# Patient Record
Sex: MALE | Race: WHITE | Employment: FULL TIME | URBAN - METROPOLITAN AREA
[De-identification: names, ages, dates, MRNs, and addresses within clinical notes are randomized per-mention and may not be internally consistent; named-entity substitution may affect disease eponyms.]

---

## 2022-05-23 ENCOUNTER — OFFICE VISIT (OUTPATIENT)
Dept: URGENT CARE | Facility: CLINIC | Age: 27
End: 2022-05-23
Payer: COMMERCIAL

## 2022-05-23 VITALS
SYSTOLIC BLOOD PRESSURE: 146 MMHG | TEMPERATURE: 98 F | HEART RATE: 112 BPM | RESPIRATION RATE: 18 BRPM | BODY MASS INDEX: 28.63 KG/M2 | OXYGEN SATURATION: 100 % | DIASTOLIC BLOOD PRESSURE: 70 MMHG | HEIGHT: 70 IN | WEIGHT: 200 LBS

## 2022-05-23 DIAGNOSIS — R36.9 PENILE DISCHARGE: ICD-10-CM

## 2022-05-23 DIAGNOSIS — R30.0 BURNING WITH URINATION: Primary | ICD-10-CM

## 2022-05-23 DIAGNOSIS — N34.2 URETHRITIS: ICD-10-CM

## 2022-05-23 LAB
BILIRUB UR QL STRIP: NEGATIVE
GLUCOSE UR QL STRIP: NEGATIVE
KETONES UR QL STRIP: NEGATIVE
LEUKOCYTE ESTERASE UR QL STRIP: NEGATIVE
PH, POC UA: 5
POC BLOOD, URINE: POSITIVE
POC NITRATES, URINE: NEGATIVE
PROT UR QL STRIP: NEGATIVE
SP GR UR STRIP: 1.02 (ref 1–1.03)
UROBILINOGEN UR STRIP-ACNC: NORMAL (ref 0.3–2.2)

## 2022-05-23 PROCEDURE — 96372 THER/PROPH/DIAG INJ SC/IM: CPT | Mod: S$GLB,,, | Performed by: INTERNAL MEDICINE

## 2022-05-23 PROCEDURE — 36415 COLL VENOUS BLD VENIPUNCTURE: CPT | Performed by: INTERNAL MEDICINE

## 2022-05-23 PROCEDURE — 3077F PR MOST RECENT SYSTOLIC BLOOD PRESSURE >= 140 MM HG: ICD-10-PCS | Mod: CPTII,S$GLB,, | Performed by: INTERNAL MEDICINE

## 2022-05-23 PROCEDURE — 81003 URINALYSIS AUTO W/O SCOPE: CPT | Mod: QW,S$GLB,, | Performed by: INTERNAL MEDICINE

## 2022-05-23 PROCEDURE — 1159F MED LIST DOCD IN RCRD: CPT | Mod: CPTII,S$GLB,, | Performed by: INTERNAL MEDICINE

## 2022-05-23 PROCEDURE — 86592 SYPHILIS TEST NON-TREP QUAL: CPT | Performed by: INTERNAL MEDICINE

## 2022-05-23 PROCEDURE — 99203 PR OFFICE/OUTPT VISIT, NEW, LEVL III, 30-44 MIN: ICD-10-PCS | Mod: 25,S$GLB,, | Performed by: INTERNAL MEDICINE

## 2022-05-23 PROCEDURE — 86803 HEPATITIS C AB TEST: CPT | Performed by: INTERNAL MEDICINE

## 2022-05-23 PROCEDURE — 87661 TRICHOMONAS VAGINALIS AMPLIF: CPT | Performed by: INTERNAL MEDICINE

## 2022-05-23 PROCEDURE — 3008F PR BODY MASS INDEX (BMI) DOCUMENTED: ICD-10-PCS | Mod: CPTII,S$GLB,, | Performed by: INTERNAL MEDICINE

## 2022-05-23 PROCEDURE — 87591 N.GONORRHOEAE DNA AMP PROB: CPT | Performed by: INTERNAL MEDICINE

## 2022-05-23 PROCEDURE — 96372 PR INJECTION,THERAP/PROPH/DIAG2ST, IM OR SUBCUT: ICD-10-PCS | Mod: S$GLB,,, | Performed by: INTERNAL MEDICINE

## 2022-05-23 PROCEDURE — 81003 POCT URINALYSIS, DIPSTICK, AUTOMATED, W/O SCOPE: ICD-10-PCS | Mod: QW,S$GLB,, | Performed by: INTERNAL MEDICINE

## 2022-05-23 PROCEDURE — 87491 CHLMYD TRACH DNA AMP PROBE: CPT | Performed by: INTERNAL MEDICINE

## 2022-05-23 PROCEDURE — 1159F PR MEDICATION LIST DOCUMENTED IN MEDICAL RECORD: ICD-10-PCS | Mod: CPTII,S$GLB,, | Performed by: INTERNAL MEDICINE

## 2022-05-23 PROCEDURE — 3077F SYST BP >= 140 MM HG: CPT | Mod: CPTII,S$GLB,, | Performed by: INTERNAL MEDICINE

## 2022-05-23 PROCEDURE — 3078F DIAST BP <80 MM HG: CPT | Mod: CPTII,S$GLB,, | Performed by: INTERNAL MEDICINE

## 2022-05-23 PROCEDURE — 3008F BODY MASS INDEX DOCD: CPT | Mod: CPTII,S$GLB,, | Performed by: INTERNAL MEDICINE

## 2022-05-23 PROCEDURE — 1160F PR REVIEW ALL MEDS BY PRESCRIBER/CLIN PHARMACIST DOCUMENTED: ICD-10-PCS | Mod: CPTII,S$GLB,, | Performed by: INTERNAL MEDICINE

## 2022-05-23 PROCEDURE — 87389 HIV-1 AG W/HIV-1&-2 AB AG IA: CPT | Performed by: INTERNAL MEDICINE

## 2022-05-23 PROCEDURE — 3078F PR MOST RECENT DIASTOLIC BLOOD PRESSURE < 80 MM HG: ICD-10-PCS | Mod: CPTII,S$GLB,, | Performed by: INTERNAL MEDICINE

## 2022-05-23 PROCEDURE — 1160F RVW MEDS BY RX/DR IN RCRD: CPT | Mod: CPTII,S$GLB,, | Performed by: INTERNAL MEDICINE

## 2022-05-23 PROCEDURE — 99203 OFFICE O/P NEW LOW 30 MIN: CPT | Mod: 25,S$GLB,, | Performed by: INTERNAL MEDICINE

## 2022-05-23 RX ORDER — DOXYCYCLINE HYCLATE 100 MG
100 TABLET ORAL 2 TIMES DAILY
Qty: 14 TABLET | Refills: 0 | Status: SHIPPED | OUTPATIENT
Start: 2022-05-23 | End: 2022-05-30

## 2022-05-23 RX ORDER — CEFTRIAXONE 500 MG/1
500 INJECTION, POWDER, FOR SOLUTION INTRAMUSCULAR; INTRAVENOUS
Status: COMPLETED | OUTPATIENT
Start: 2022-05-23 | End: 2022-05-23

## 2022-05-23 RX ADMIN — CEFTRIAXONE 500 MG: 500 INJECTION, POWDER, FOR SOLUTION INTRAMUSCULAR; INTRAVENOUS at 05:05

## 2022-05-23 NOTE — PROGRESS NOTES
"Subjective:       Patient ID: eBnnett Rubin is a 26 y.o. male.    Vitals:  height is 5' 10" (1.778 m) and weight is 90.7 kg (200 lb). His tympanic temperature is 97.9 °F (36.6 °C). His blood pressure is 146/70 (abnormal) and his pulse is 112 (abnormal). His respiration is 18 and oxygen saturation is 100%.     Chief Complaint: Dysuria    Started yesterday. Burning and pain with urination. States that he uses condoms when having intercourse.  Last encounter was last week    Dysuria   This is a new problem. The current episode started yesterday. The problem has been unchanged. The quality of the pain is described as burning and aching. The pain is at a severity of 5/10. The pain is moderate. There has been no fever. He is sexually active. There is no history of pyelonephritis. Associated symptoms include a discharge. Pertinent negatives include no behavior changes, chills, flank pain, frequency, hematuria, hesitancy, nausea, possible pregnancy, sweats, urgency, vomiting, weight loss, bubble bath use, constipation, rash or withholding. He has tried nothing for the symptoms. The treatment provided no relief.       Constitution: Negative for chills.   Gastrointestinal: Negative for nausea, vomiting and constipation.   Genitourinary: Positive for dysuria. Negative for frequency, urgency, flank pain and hematuria.   Skin: Negative for rash and erythema.       Objective:      Physical Exam   Constitutional: He is oriented to person, place, and time. He appears well-developed. No distress.   HENT:   Head: Normocephalic and atraumatic.   Ears:   Right Ear: External ear normal.   Left Ear: External ear normal.   Nose: Nose normal.   Mouth/Throat: Oropharynx is clear and moist. No oropharyngeal exudate.   Eyes: Conjunctivae and EOM are normal. Pupils are equal, round, and reactive to light. Right eye exhibits no discharge. Left eye exhibits no discharge. No scleral icterus.   Neck: Neck supple.   Cardiovascular: Regular rhythm and " normal heart sounds. Tachycardia present.   No murmur heard.Exam reveals no gallop and no friction rub.      Comments: Patient is nervous which likely is reason for tachycardia   Pulmonary/Chest: Effort normal. No respiratory distress. He has no wheezes. He has no rales.   Abdominal: Bowel sounds are normal. He exhibits no distension. Soft.   Genitourinary:    Testes normal.   Right testis shows no mass, no swelling and no tenderness. Left testis shows no mass, no swelling and no tenderness. No penile swelling. Penis exhibits no lesions. Discharge found.               Comments: Slight red rash on inner right thigh.      Lymphadenopathy:     He has no cervical adenopathy.   Neurological: He is alert and oriented to person, place, and time.   Skin: Skin is warm, dry, not diaphoretic and no rash. Capillary refill takes less than 2 seconds. No erythema   Psychiatric: His behavior is normal.   Nursing note and vitals reviewed.        Assessment:       1. Burning with urination    2. Urethritis    3. Penile discharge          Plan:         Burning with urination  -     POCT Urinalysis, Dipstick, Automated, W/O Scope    Urethritis  -     cefTRIAXone injection 500 mg  -     doxycycline (VIBRA-TABS) 100 MG tablet; Take 1 tablet (100 mg total) by mouth 2 (two) times daily. for 7 days  Dispense: 14 tablet; Refill: 0  -     C. trachomatis/N. gonorrhoeae by [facility]; Urine  -     HIV 1/2 Ag/Ab (4th Gen)  -     Hepatitis C Ab  -     RPR  -     Trichomonas vaginalis, RNA, Qual, Urine (Males Only)    Penile discharge  -     cefTRIAXone injection 500 mg  -     doxycycline (VIBRA-TABS) 100 MG tablet; Take 1 tablet (100 mg total) by mouth 2 (two) times daily. for 7 days  Dispense: 14 tablet; Refill: 0  -     C. trachomatis/N. gonorrhoeae by [facility]; Urine  -     HIV 1/2 Ag/Ab (4th Gen)  -     Hepatitis C Ab  -     RPR  -     Trichomonas vaginalis, RNA, Qual, Urine (Males Only)

## 2022-05-23 NOTE — PATIENT INSTRUCTIONS
Start doxycycline. We will call with results of your STD testing. Inform all recent sexual partners (1 month) about any positive testing.

## 2022-05-24 LAB
C TRACH DNA SPEC QL NAA+PROBE: NOT DETECTED
N GONORRHOEA DNA SPEC QL NAA+PROBE: DETECTED

## 2022-05-25 ENCOUNTER — TELEPHONE (OUTPATIENT)
Dept: URGENT CARE | Facility: CLINIC | Age: 27
End: 2022-05-25
Payer: COMMERCIAL

## 2022-05-25 LAB
HCV AB SERPL QL IA: NEGATIVE
HIV 1+2 AB+HIV1 P24 AG SERPL QL IA: NEGATIVE
RPR SER QL: NORMAL

## 2022-05-25 NOTE — TELEPHONE ENCOUNTER
Patient was given appropriate medicine to treat gonorrhea which was positive.    Results for orders placed or performed in visit on 05/23/22   C. trachomatis/N. gonorrhoeae by [facility]; Urine    Specimen: Genital   Result Value Ref Range    Chlamydia, Amplified DNA Not Detected Not Detected    N gonorrhoeae, amplified DNA Detected (A) Not Detected   POCT Urinalysis, Dipstick, Automated, W/O Scope   Result Value Ref Range    POC Blood, Urine Positive (A) Negative    POC Bilirubin, Urine Negative Negative    POC Urobilinogen, Urine normal 0.3 - 2.2    POC Ketones, Urine Negative Negative    POC Protein, Urine Negative Negative    POC Nitrates, Urine Negative Negative    POC Glucose, Urine Negative Negative    pH, UA 5.0     POC Specific Gravity, Urine 1.025 1.003 - 1.029    POC Leukocytes, Urine Negative Negative     YOU HAVE BEEN TREATED FOR SEXUALLY TRANSMITTED DISEASE:   You were prophylactically treated for gonorrhea AND have additional pills to take as directed.  - We will call you with test results within the next week  -syphilis, HIV, and hepatitis still pending.--we will call you with these results as they come in.      Please seek medical attention if you begin to experience any new symptoms.     Increase condom use to prevent further occurance.  Notify sexual partners of the need for testing.  Complete ALL medication prescribed!    NO sexual intercourse UNTIL 7 days after treatment.     Retest to ensure infection has cleared-there are infections that require more agressive treatment.  Retest for all STDS in 6 weeks and again in 6 months to ensure true negative results.      Please note: Today's testing will give no crediable information if you have unprotected sexual activities going forward. Syphillis cases are rising!  Gonorrhea has RESISTANT strains which is why repeat testing after treatment is important.  Gonorrhea may be present in multiple sites from just ONE area of exposure.  For those who  have high risk sexual behaviors and are on Truvada for PrEP- you have additional protection against HIV ONLY.      REMEMBER WEAR CONDOMS AND GET TESTED OFTEN!

## 2022-05-26 ENCOUNTER — TELEPHONE (OUTPATIENT)
Dept: URGENT CARE | Facility: CLINIC | Age: 27
End: 2022-05-26
Payer: COMMERCIAL

## 2022-05-27 ENCOUNTER — TELEPHONE (OUTPATIENT)
Dept: URGENT CARE | Facility: CLINIC | Age: 27
End: 2022-05-27
Payer: COMMERCIAL

## 2022-05-27 LAB
T VAGINALIS RRNA SPEC QL NAA+PROBE: NOT DETECTED
TRICHOMONAS VAGINALIS RNA, QUAL, SOURCE: NORMAL

## 2022-05-27 NOTE — TELEPHONE ENCOUNTER
Attempted to notify pt that full STD panel negative except for gonorrhea which he was already adequately treated with doxycycline.    No answer, voicemail left to return call to urgent care.    Results for orders placed or performed in visit on 05/23/22   C. trachomatis/N. gonorrhoeae by [facility]; Urine    Specimen: Genital   Result Value Ref Range    Chlamydia, Amplified DNA Not Detected Not Detected    N gonorrhoeae, amplified DNA Detected (A) Not Detected   Trichomonas vaginalis, RNA, Qual, Urine (Males Only)    Specimen: Urine   Result Value Ref Range    Trichomonas vaginalis RNA, Qual, source See Below     Trichomonas vaginalis, QL, TMA Not detected    HIV 1/2 Ag/Ab (4th Gen)   Result Value Ref Range    HIV 1/2 Ag/Ab Negative Negative   Hepatitis C Ab   Result Value Ref Range    Hepatitis C Ab Negative Negative   RPR   Result Value Ref Range    RPR Non-reactive Non-reactive   POCT Urinalysis, Dipstick, Automated, W/O Scope   Result Value Ref Range    POC Blood, Urine Positive (A) Negative    POC Bilirubin, Urine Negative Negative    POC Urobilinogen, Urine normal 0.3 - 2.2    POC Ketones, Urine Negative Negative    POC Protein, Urine Negative Negative    POC Nitrates, Urine Negative Negative    POC Glucose, Urine Negative Negative    pH, UA 5.0     POC Specific Gravity, Urine 1.025 1.003 - 1.029    POC Leukocytes, Urine Negative Negative

## 2022-10-06 ENCOUNTER — OFFICE VISIT (OUTPATIENT)
Dept: INTERNAL MEDICINE | Facility: CLINIC | Age: 27
End: 2022-10-06
Payer: COMMERCIAL

## 2022-10-06 ENCOUNTER — LAB VISIT (OUTPATIENT)
Dept: LAB | Facility: HOSPITAL | Age: 27
End: 2022-10-06
Attending: FAMILY MEDICINE
Payer: COMMERCIAL

## 2022-10-06 VITALS
BODY MASS INDEX: 28.66 KG/M2 | DIASTOLIC BLOOD PRESSURE: 70 MMHG | SYSTOLIC BLOOD PRESSURE: 122 MMHG | TEMPERATURE: 98 F | HEART RATE: 86 BPM | HEIGHT: 70 IN | WEIGHT: 200.19 LBS

## 2022-10-06 DIAGNOSIS — Z00.00 WELLNESS EXAMINATION: ICD-10-CM

## 2022-10-06 DIAGNOSIS — Z00.00 WELLNESS EXAMINATION: Primary | ICD-10-CM

## 2022-10-06 DIAGNOSIS — Z86.19 HISTORY OF CHLAMYDIA: ICD-10-CM

## 2022-10-06 DIAGNOSIS — F17.200 SMOKER: ICD-10-CM

## 2022-10-06 DIAGNOSIS — N52.9 ERECTILE DYSFUNCTION, UNSPECIFIED ERECTILE DYSFUNCTION TYPE: ICD-10-CM

## 2022-10-06 DIAGNOSIS — L70.9 ACNE, UNSPECIFIED ACNE TYPE: ICD-10-CM

## 2022-10-06 LAB
ALBUMIN SERPL BCP-MCNC: 4.3 G/DL (ref 3.5–5.2)
ALP SERPL-CCNC: 37 U/L (ref 55–135)
ALT SERPL W/O P-5'-P-CCNC: 31 U/L (ref 10–44)
ANION GAP SERPL CALC-SCNC: 11 MMOL/L (ref 8–16)
AST SERPL-CCNC: 27 U/L (ref 10–40)
BASOPHILS # BLD AUTO: 0.03 K/UL (ref 0–0.2)
BASOPHILS NFR BLD: 0.3 % (ref 0–1.9)
BILIRUB SERPL-MCNC: 0.6 MG/DL (ref 0.1–1)
BUN SERPL-MCNC: 15 MG/DL (ref 6–20)
CALCIUM SERPL-MCNC: 10.1 MG/DL (ref 8.7–10.5)
CHLORIDE SERPL-SCNC: 102 MMOL/L (ref 95–110)
CHOLEST SERPL-MCNC: 207 MG/DL (ref 120–199)
CHOLEST/HDLC SERPL: 5.2 {RATIO} (ref 2–5)
CO2 SERPL-SCNC: 26 MMOL/L (ref 23–29)
CREAT SERPL-MCNC: 1.1 MG/DL (ref 0.5–1.4)
DIFFERENTIAL METHOD: ABNORMAL
EOSINOPHIL # BLD AUTO: 0.1 K/UL (ref 0–0.5)
EOSINOPHIL NFR BLD: 1.3 % (ref 0–8)
ERYTHROCYTE [DISTWIDTH] IN BLOOD BY AUTOMATED COUNT: 13.2 % (ref 11.5–14.5)
EST. GFR  (NO RACE VARIABLE): >60 ML/MIN/1.73 M^2
ESTIMATED AVG GLUCOSE: 103 MG/DL (ref 68–131)
GLUCOSE SERPL-MCNC: 71 MG/DL (ref 70–110)
HBA1C MFR BLD: 5.2 % (ref 4–5.6)
HCT VFR BLD AUTO: 52.4 % (ref 40–54)
HDLC SERPL-MCNC: 40 MG/DL (ref 40–75)
HDLC SERPL: 19.3 % (ref 20–50)
HGB BLD-MCNC: 16.9 G/DL (ref 14–18)
IMM GRANULOCYTES # BLD AUTO: 0.04 K/UL (ref 0–0.04)
IMM GRANULOCYTES NFR BLD AUTO: 0.4 % (ref 0–0.5)
LDLC SERPL CALC-MCNC: 147.2 MG/DL (ref 63–159)
LYMPHOCYTES # BLD AUTO: 1.6 K/UL (ref 1–4.8)
LYMPHOCYTES NFR BLD: 17.7 % (ref 18–48)
MCH RBC QN AUTO: 30.8 PG (ref 27–31)
MCHC RBC AUTO-ENTMCNC: 32.3 G/DL (ref 32–36)
MCV RBC AUTO: 95 FL (ref 82–98)
MONOCYTES # BLD AUTO: 1.1 K/UL (ref 0.3–1)
MONOCYTES NFR BLD: 11.8 % (ref 4–15)
NEUTROPHILS # BLD AUTO: 6.3 K/UL (ref 1.8–7.7)
NEUTROPHILS NFR BLD: 68.5 % (ref 38–73)
NONHDLC SERPL-MCNC: 167 MG/DL
NRBC BLD-RTO: 0 /100 WBC
PLATELET # BLD AUTO: 305 K/UL (ref 150–450)
PMV BLD AUTO: 10.6 FL (ref 9.2–12.9)
POTASSIUM SERPL-SCNC: 4.4 MMOL/L (ref 3.5–5.1)
PROT SERPL-MCNC: 7.9 G/DL (ref 6–8.4)
RBC # BLD AUTO: 5.49 M/UL (ref 4.6–6.2)
SODIUM SERPL-SCNC: 139 MMOL/L (ref 136–145)
TRIGL SERPL-MCNC: 99 MG/DL (ref 30–150)
TSH SERPL DL<=0.005 MIU/L-ACNC: 1.25 UIU/ML (ref 0.4–4)
WBC # BLD AUTO: 9.2 K/UL (ref 3.9–12.7)

## 2022-10-06 PROCEDURE — 1159F MED LIST DOCD IN RCRD: CPT | Mod: CPTII,S$GLB,, | Performed by: FAMILY MEDICINE

## 2022-10-06 PROCEDURE — 90677 PCV20 VACCINE IM: CPT | Mod: S$GLB,,, | Performed by: FAMILY MEDICINE

## 2022-10-06 PROCEDURE — 90471 IMMUNIZATION ADMIN: CPT | Mod: S$GLB,,, | Performed by: FAMILY MEDICINE

## 2022-10-06 PROCEDURE — 99999 PR PBB SHADOW E&M-EST. PATIENT-LVL V: ICD-10-PCS | Mod: PBBFAC,,, | Performed by: FAMILY MEDICINE

## 2022-10-06 PROCEDURE — 90472 IMMUNIZATION ADMIN EACH ADD: CPT | Mod: S$GLB,,, | Performed by: FAMILY MEDICINE

## 2022-10-06 PROCEDURE — 90715 TDAP VACCINE 7 YRS/> IM: CPT | Mod: S$GLB,,, | Performed by: FAMILY MEDICINE

## 2022-10-06 PROCEDURE — 3008F BODY MASS INDEX DOCD: CPT | Mod: CPTII,S$GLB,, | Performed by: FAMILY MEDICINE

## 2022-10-06 PROCEDURE — 84443 ASSAY THYROID STIM HORMONE: CPT | Performed by: FAMILY MEDICINE

## 2022-10-06 PROCEDURE — 99385 PR PREVENTIVE VISIT,NEW,18-39: ICD-10-PCS | Mod: 25,S$GLB,, | Performed by: FAMILY MEDICINE

## 2022-10-06 PROCEDURE — 99385 PREV VISIT NEW AGE 18-39: CPT | Mod: 25,S$GLB,, | Performed by: FAMILY MEDICINE

## 2022-10-06 PROCEDURE — 90715 TDAP VACCINE GREATER THAN OR EQUAL TO 7YO IM: ICD-10-PCS | Mod: S$GLB,,, | Performed by: FAMILY MEDICINE

## 2022-10-06 PROCEDURE — 90471 TDAP VACCINE GREATER THAN OR EQUAL TO 7YO IM: ICD-10-PCS | Mod: S$GLB,,, | Performed by: FAMILY MEDICINE

## 2022-10-06 PROCEDURE — 90677 PNEUMOCOCCAL CONJUGATE VACCINE 20-VALENT: ICD-10-PCS | Mod: S$GLB,,, | Performed by: FAMILY MEDICINE

## 2022-10-06 PROCEDURE — 36415 COLL VENOUS BLD VENIPUNCTURE: CPT | Mod: PO | Performed by: FAMILY MEDICINE

## 2022-10-06 PROCEDURE — 82040 ASSAY OF SERUM ALBUMIN: CPT | Performed by: FAMILY MEDICINE

## 2022-10-06 PROCEDURE — 99999 PR PBB SHADOW E&M-EST. PATIENT-LVL V: CPT | Mod: PBBFAC,,, | Performed by: FAMILY MEDICINE

## 2022-10-06 PROCEDURE — 80053 COMPREHEN METABOLIC PANEL: CPT | Performed by: FAMILY MEDICINE

## 2022-10-06 PROCEDURE — 85025 COMPLETE CBC W/AUTO DIFF WBC: CPT | Performed by: FAMILY MEDICINE

## 2022-10-06 PROCEDURE — 3008F PR BODY MASS INDEX (BMI) DOCUMENTED: ICD-10-PCS | Mod: CPTII,S$GLB,, | Performed by: FAMILY MEDICINE

## 2022-10-06 PROCEDURE — 80061 LIPID PANEL: CPT | Performed by: FAMILY MEDICINE

## 2022-10-06 PROCEDURE — 1159F PR MEDICATION LIST DOCUMENTED IN MEDICAL RECORD: ICD-10-PCS | Mod: CPTII,S$GLB,, | Performed by: FAMILY MEDICINE

## 2022-10-06 PROCEDURE — 83036 HEMOGLOBIN GLYCOSYLATED A1C: CPT | Performed by: FAMILY MEDICINE

## 2022-10-06 PROCEDURE — 90472 PNEUMOCOCCAL CONJUGATE VACCINE 20-VALENT: ICD-10-PCS | Mod: S$GLB,,, | Performed by: FAMILY MEDICINE

## 2022-10-06 NOTE — PROGRESS NOTES
Subjective:       Patient ID: Bennett Rubin is a 27 y.o. male.    Chief Complaint: Establish Care    Bennett Rubin is a 27 y.o. male and is here for a comprehensive physical exam.    Do you take any herbs or supplements that were not prescribed by a doctor? Multivit.  Are you taking calcium supplements? no  Are you taking aspirin daily? no     History:  Any STD's in the past? History of chlamydia    The following portions of the patient's history were reviewed and updated as appropriate: allergies, current medications, past family history, past medical history, past social history, past surgical history and problem list.    Review of Systems  Do you have pain that bothers you in your daily life? no  Pertinent items are noted in HPI.      2. Patient Counseling:  --Nutrition: Stressed importance of moderation in sodium/caffeine intake, saturated fat and cholesterol, caloric balance.  --Exercise: Stressed the importance of regular exercise.   --Substance Abuse: Discussed cessation/primary prevention of tobacco, alcohol - smoker   --Sexuality: Discussed sexually transmitted disease.  --Injury prevention: Discussed safety belts, smoke detector.   --Dental health: Discussed dental health.  --Immunizations reviewed.      3. Discussed the patient's BMI.  4. Follow up as needed for acute illness        Review of Systems   Constitutional:  Negative for activity change.   HENT:  Negative for ear pain.    Eyes:  Negative for pain.   Respiratory:  Negative for shortness of breath.    Cardiovascular:  Negative for chest pain.   Gastrointestinal:  Negative for abdominal pain.   Genitourinary:  Negative for dysuria.   Musculoskeletal:  Negative for neck pain.   Skin:  Negative for rash.   Neurological:  Negative for headaches.     Objective:      Physical Exam  Vitals and nursing note reviewed.   Constitutional:       General: He is not in acute distress.     Appearance: Normal appearance. He is well-developed. He is not diaphoretic.    HENT:      Head: Normocephalic and atraumatic.   Cardiovascular:      Rate and Rhythm: Normal rate and regular rhythm.   Pulmonary:      Effort: Pulmonary effort is normal. No respiratory distress.      Breath sounds: Normal breath sounds. No wheezing.   Abdominal:      General: There is no distension.      Palpations: Abdomen is soft.      Tenderness: There is no abdominal tenderness. There is no guarding.   Musculoskeletal:      Right lower leg: No edema.      Left lower leg: No edema.   Skin:     General: Skin is warm and dry.      Findings: No erythema or rash.   Neurological:      Mental Status: He is alert. Mental status is at baseline.   Psychiatric:         Mood and Affect: Mood normal.         Thought Content: Thought content normal.       Assessment:       1. Wellness examination    2. Smoker    3. Acne, unspecified acne type    4. History of chlamydia    5. Erectile dysfunction, unspecified erectile dysfunction type          Plan:     Problem List Items Addressed This Visit          Derm    Acne    Relevant Orders    Ambulatory referral/consult to Dermatology       Renal/    Erectile dysfunction    Relevant Orders    Ambulatory referral/consult to Urology       ID    History of chlamydia       Other    Wellness examination - Primary    Relevant Orders    CBC Auto Differential    Comprehensive Metabolic Panel    Lipid Panel    TSH    Tdap Vaccine    Hemoglobin A1C    (In Office Administered) Pneumococcal Conjugate Vaccine (20 Valent) (IM)    Testosterone Panel    Smoker

## 2022-10-17 LAB
ALBUMIN SERPL-MCNC: 4.7 G/DL (ref 3.6–5.1)
SHBG SERPL-SCNC: 14 NMOL/L (ref 10–50)
TESTOST FREE SERPL-MCNC: 1240.9 PG/ML (ref 46–224)
TESTOST SERPL-MCNC: 3315 NG/DL (ref 250–1100)
TESTOSTERONE.FREE+WB SERPL-MCNC: 2659.8 NG/DL (ref 110–575)

## 2022-10-25 ENCOUNTER — OFFICE VISIT (OUTPATIENT)
Dept: DERMATOLOGY | Facility: CLINIC | Age: 27
End: 2022-10-25
Payer: COMMERCIAL

## 2022-10-25 DIAGNOSIS — Z76.89 ENCOUNTER FOR SKIN CARE: Primary | ICD-10-CM

## 2022-10-25 DIAGNOSIS — L70.9 ACNE, UNSPECIFIED ACNE TYPE: ICD-10-CM

## 2022-10-25 PROCEDURE — 1160F PR REVIEW ALL MEDS BY PRESCRIBER/CLIN PHARMACIST DOCUMENTED: ICD-10-PCS | Mod: CPTII,95,, | Performed by: DERMATOLOGY

## 2022-10-25 PROCEDURE — 1159F PR MEDICATION LIST DOCUMENTED IN MEDICAL RECORD: ICD-10-PCS | Mod: CPTII,95,, | Performed by: DERMATOLOGY

## 2022-10-25 PROCEDURE — 3044F PR MOST RECENT HEMOGLOBIN A1C LEVEL <7.0%: ICD-10-PCS | Mod: CPTII,95,, | Performed by: DERMATOLOGY

## 2022-10-25 PROCEDURE — 99204 PR OFFICE/OUTPT VISIT, NEW, LEVL IV, 45-59 MIN: ICD-10-PCS | Mod: 95,,, | Performed by: DERMATOLOGY

## 2022-10-25 PROCEDURE — 1160F RVW MEDS BY RX/DR IN RCRD: CPT | Mod: CPTII,95,, | Performed by: DERMATOLOGY

## 2022-10-25 PROCEDURE — 99204 OFFICE O/P NEW MOD 45 MIN: CPT | Mod: 95,,, | Performed by: DERMATOLOGY

## 2022-10-25 PROCEDURE — 1159F MED LIST DOCD IN RCRD: CPT | Mod: CPTII,95,, | Performed by: DERMATOLOGY

## 2022-10-25 PROCEDURE — 3044F HG A1C LEVEL LT 7.0%: CPT | Mod: CPTII,95,, | Performed by: DERMATOLOGY

## 2022-10-25 RX ORDER — CLINDAMYCIN PHOSPHATE 10 UG/ML
LOTION TOPICAL
Qty: 120 ML | Refills: 4 | Status: SHIPPED | OUTPATIENT
Start: 2022-10-25 | End: 2023-01-11 | Stop reason: SDUPTHER

## 2022-10-25 NOTE — PROGRESS NOTES
The patient location is: car  The chief complaint leading to consultation is: acne    Visit type: audiovisual    Face to Face time with patient: 8 m  12 minutes of total time spent on the encounter, which includes face to face time and non-face to face time preparing to see the patient (eg, review of tests), Obtaining and/or reviewing separately obtained history, Documenting clinical information in the electronic or other health record, Independently interpreting results (not separately reported) and communicating results to the patient/family/caregiver, or Care coordination (not separately reported).         Each patient to whom he or she provides medical services by telemedicine is:  (1) informed of the relationship between the physician and patient and the respective role of any other health care provider with respect to management of the patient; and (2) notified that he or she may decline to receive medical services by telemedicine and may withdraw from such care at any time.    Notes:     Subjective:       Patient ID:  Bennett Rubin is a 27 y.o. male who presents for No chief complaint on file.    Pt co breakouts on and off of the body x 1 year or so.  Using neutrogena acne soap and scrubber wo help.      Review of Systems   Constitutional:  Negative for fever.   HENT:  Negative for sore throat.    Respiratory:  Negative for cough.    Psychiatric/Behavioral:  Positive for high stress.       Objective:    Physical Exam   Constitutional: He appears well-developed and well-nourished.   Eyes: No conjunctival no injection.   Neurological: He is alert and oriented to person, place, and time.   Psychiatric: He has a normal mood and affect.   Skin:   Areas Examined (abnormalities noted in diagram):   Head / Face Inspection Performed       Diagram Legend     Erythematous scaling macule/papule c/w actinic keratosis       Vascular papule c/w angioma      Pigmented verrucoid papule/plaque c/w seborrheic keratosis      Yellow  umbilicated papule c/w sebaceous hyperplasia      Irregularly shaped tan macule c/w lentigo     1-2 mm smooth white papules consistent with Milia      Movable subcutaneous cyst with punctum c/w epidermal inclusion cyst      Subcutaneous movable cyst c/w pilar cyst      Firm pink to brown papule c/w dermatofibroma      Pedunculated fleshy papule(s) c/w skin tag(s)      Evenly pigmented macule c/w junctional nevus     Mildly variegated pigmented, slightly irregular-bordered macule c/w mildly atypical nevus      Flesh colored to evenly pigmented papule c/w intradermal nevus       Pink pearly papule/plaque c/w basal cell carcinoma      Erythematous hyperkeratotic cursted plaque c/w SCC      Surgical scar with no sign of skin cancer recurrence      Open and closed comedones      Inflammatory papules and pustules      Verrucoid papule consistent consistent with wart     Erythematous eczematous patches and plaques     Dystrophic onycholytic nail with subungual debris c/w onychomycosis     Umbilicated papule    Erythematous-base heme-crusted tan verrucoid plaque consistent with inflamed seborrheic keratosis     Erythematous Silvery Scaling Plaque c/w Psoriasis     See annotation            Assessment / Plan:        Encounter for skin care  No hot water bathing reviewed.  Shower sooner than later after exercise, exertion, or sweating.  Can do wash cloth wipes if more convenient.  Sweat can cause irritation and may exacerbate skin conditions.  Use corn starch as a drying powder.    Acne, unspecified acne type  -     Ambulatory referral/consult to Dermatology  -     clindamycin (CLEOCIN T) 1 % lotion; AAA bid.  If doing well, qd only.  Dispense: 120 mL; Refill: 4  Long term and slow treatment treatment required for acne discussed today.  Gentle skin care with avoidance of hot water and usage of oil free products discussed.  Avoidance of lotions and make up discussed in addition to all other products.  Compliance with  prescribed regimen discussed.  No picking or squeezing of acne lesions discussed.  Focal coconut oil or jojoba oil as needed for dry areas.  Hold acne topicals if skin is getting too dry.  Resume when ready.  Reviewed with patient different treatment options and associated risks.  Proper application of medications and or care for affected area(s) and condition(s) reviewed.  Patient to start using sulfur bar soap for the face or affected area 1-2 x day.  For scalp usage lather from the soap to be applied to the roots of the scalp and allow to sit for 3-5 minutes regularly.  Same instructions for other affected areas.  Back applicator for creams, lotions, topical medications discussed.  Can try plastic mixing spoons or spatulas also or soft shower brushes.  Discussed getting and usage of a scrub brush for the back and loofa for the chest and shoulders and other body parts.  Patient and or guardian to monitor this area/lesion or these areas/lesions for changes or worsening or darkening (for moles and freckles).  Patient and or guardian to contact us if any changes are noted for such.      Stress  Reviewed that condition can be triggered and worsened by stress.  Consider stress reducing exercises or activities, like walks, physical exercise, yoga, meditation, and breathing exercises.  Patient to research such activities on line.      Diet  Diet with increased fiber, avoidance of sugars, avoidance of dairy, avoidance of wheat and white flour (gluten) all discussed.  Avoid caffeine and alcohol and energy drinks.  Look up Greyson Alexandra's anti inflammatory diet.  Don't worry about extraneous details, like avoidance of tomatoes and mushrooms.             Follow up in about 1 year (around 10/25/2023).

## 2022-10-25 NOTE — PATIENT INSTRUCTIONS
No hot water bathing reviewed.    Can start niacinamide 500 mg 2-3 x day and msm 500 mg 2-3 x day.  Turmeric - start with 500mg every day and increase to 1 g every day (may cause GI upset)- 100x more anti-inflammatory if mixed with black pepper or with fatty food    Reviewed that condition can be triggered and worsened by stress.  Consider stress reducing exercises or activities, like walks, physical exercise, yoga, meditation, and breathing exercises.  Patient to research such activities on line.    Patient to start using sulfur bar soap for the face or affected area 1-2 x day.  For scalp usage lather from the soap to be applied to the roots of the scalp and allow to sit for 3-5 minutes regularly.  Same instructions for other affected areas.    Shower sooner than later after exercise, exertion, or sweating.  Can do wash cloth wipes if more convenient.  Sweat can cause irritation and may exacerbate skin conditions.  Use corn starch as a drying powder.    Back applicator for creams, lotions, topical medications discussed.  Can try plastic mixing spoons or spatulas also or soft shower brushes.    Discussed getting and usage of a scrub brush for the back and loofa for the chest and shoulders and other body parts.    Diet with increased fiber, avoidance of sugars, avoidance of dairy, avoidance of wheat and white flour (gluten) all discussed.  Avoid caffeine and alcohol and energy drinks.  Look up Greyson Alexandra's anti inflammatory diet.  Don't worry about extraneous details, like avoidance of tomatoes and mushrooms.

## 2022-11-01 ENCOUNTER — OFFICE VISIT (OUTPATIENT)
Dept: URGENT CARE | Facility: CLINIC | Age: 27
End: 2022-11-01
Payer: COMMERCIAL

## 2022-11-01 VITALS
TEMPERATURE: 98 F | SYSTOLIC BLOOD PRESSURE: 132 MMHG | HEIGHT: 70 IN | WEIGHT: 200 LBS | BODY MASS INDEX: 28.63 KG/M2 | DIASTOLIC BLOOD PRESSURE: 63 MMHG | RESPIRATION RATE: 16 BRPM | OXYGEN SATURATION: 98 % | HEART RATE: 78 BPM

## 2022-11-01 DIAGNOSIS — J00 ACUTE NASOPHARYNGITIS (COMMON COLD): Primary | ICD-10-CM

## 2022-11-01 DIAGNOSIS — J02.9 ACUTE SORE THROAT: ICD-10-CM

## 2022-11-01 DIAGNOSIS — J06.9 URI WITH COUGH AND CONGESTION: ICD-10-CM

## 2022-11-01 DIAGNOSIS — R09.82 POSTNASAL DRIP: ICD-10-CM

## 2022-11-01 LAB
CTP QC/QA: YES
POC MOLECULAR INFLUENZA A AGN: NEGATIVE
POC MOLECULAR INFLUENZA B AGN: NEGATIVE

## 2022-11-01 PROCEDURE — 3078F DIAST BP <80 MM HG: CPT | Mod: CPTII,S$GLB,, | Performed by: PHYSICIAN ASSISTANT

## 2022-11-01 PROCEDURE — 3078F PR MOST RECENT DIASTOLIC BLOOD PRESSURE < 80 MM HG: ICD-10-PCS | Mod: CPTII,S$GLB,, | Performed by: PHYSICIAN ASSISTANT

## 2022-11-01 PROCEDURE — 1159F PR MEDICATION LIST DOCUMENTED IN MEDICAL RECORD: ICD-10-PCS | Mod: CPTII,S$GLB,, | Performed by: PHYSICIAN ASSISTANT

## 2022-11-01 PROCEDURE — 3075F SYST BP GE 130 - 139MM HG: CPT | Mod: CPTII,S$GLB,, | Performed by: PHYSICIAN ASSISTANT

## 2022-11-01 PROCEDURE — 3044F HG A1C LEVEL LT 7.0%: CPT | Mod: CPTII,S$GLB,, | Performed by: PHYSICIAN ASSISTANT

## 2022-11-01 PROCEDURE — 3075F PR MOST RECENT SYSTOLIC BLOOD PRESS GE 130-139MM HG: ICD-10-PCS | Mod: CPTII,S$GLB,, | Performed by: PHYSICIAN ASSISTANT

## 2022-11-01 PROCEDURE — 1159F MED LIST DOCD IN RCRD: CPT | Mod: CPTII,S$GLB,, | Performed by: PHYSICIAN ASSISTANT

## 2022-11-01 PROCEDURE — 99214 PR OFFICE/OUTPT VISIT, EST, LEVL IV, 30-39 MIN: ICD-10-PCS | Mod: S$GLB,,, | Performed by: PHYSICIAN ASSISTANT

## 2022-11-01 PROCEDURE — 99214 OFFICE O/P EST MOD 30 MIN: CPT | Mod: S$GLB,,, | Performed by: PHYSICIAN ASSISTANT

## 2022-11-01 PROCEDURE — 3044F PR MOST RECENT HEMOGLOBIN A1C LEVEL <7.0%: ICD-10-PCS | Mod: CPTII,S$GLB,, | Performed by: PHYSICIAN ASSISTANT

## 2022-11-01 PROCEDURE — 1160F PR REVIEW ALL MEDS BY PRESCRIBER/CLIN PHARMACIST DOCUMENTED: ICD-10-PCS | Mod: CPTII,S$GLB,, | Performed by: PHYSICIAN ASSISTANT

## 2022-11-01 PROCEDURE — 1160F RVW MEDS BY RX/DR IN RCRD: CPT | Mod: CPTII,S$GLB,, | Performed by: PHYSICIAN ASSISTANT

## 2022-11-01 PROCEDURE — 87502 INFLUENZA DNA AMP PROBE: CPT | Mod: QW,S$GLB,, | Performed by: PHYSICIAN ASSISTANT

## 2022-11-01 PROCEDURE — 87502 POCT INFLUENZA A/B MOLECULAR: ICD-10-PCS | Mod: QW,S$GLB,, | Performed by: PHYSICIAN ASSISTANT

## 2022-11-01 RX ORDER — FLUTICASONE PROPIONATE 50 MCG
1 SPRAY, SUSPENSION (ML) NASAL DAILY
Qty: 9.9 ML | Refills: 0 | Status: SHIPPED | OUTPATIENT
Start: 2022-11-01 | End: 2022-12-01

## 2022-11-01 RX ORDER — BENZONATATE 100 MG/1
200 CAPSULE ORAL 3 TIMES DAILY PRN
Qty: 15 CAPSULE | Refills: 0 | Status: SHIPPED | OUTPATIENT
Start: 2022-11-01 | End: 2022-11-06

## 2022-11-01 NOTE — PROGRESS NOTES
"Subjective:       Patient ID: Bennett Rubin is a 27 y.o. male.    Vitals:  height is 5' 10" (1.778 m) and weight is 90.7 kg (200 lb). His oral temperature is 98.1 °F (36.7 °C). His blood pressure is 132/63 and his pulse is 78. His respiration is 16 and oxygen saturation is 98%.     Chief Complaint: Cough    27-year-old male presents urgent care complaining of cough and congestion which began over the weekend.  Reports that he was exposed to his son who has flu-like symptoms.  Associated postnasal drip sore throat.  Tried over-the-counter NyQuil last night with mild relief.    Cough  This is a new problem. The current episode started in the past 7 days. The problem has been gradually worsening. The problem occurs hourly. The cough is Productive of sputum. Associated symptoms include nasal congestion, postnasal drip, rhinorrhea and a sore throat. Pertinent negatives include no chest pain, chills, ear congestion, ear pain, fever, headaches, heartburn, hemoptysis, myalgias, rash, shortness of breath, sweats, weight loss or wheezing. Risk factors for lung disease include smoking/tobacco exposure. He has tried OTC cough suppressant for the symptoms. The treatment provided mild relief. There is no history of asthma, bronchiectasis, bronchitis, COPD, emphysema, environmental allergies or pneumonia.     Constitution: Negative for chills and fever.   HENT:  Positive for postnasal drip and sore throat. Negative for ear pain.    Cardiovascular:  Negative for chest pain.   Respiratory:  Positive for cough. Negative for bloody sputum, shortness of breath and wheezing.    Gastrointestinal:  Negative for heartburn.   Musculoskeletal:  Negative for muscle ache.   Skin:  Negative for rash.   Allergic/Immunologic: Negative for environmental allergies.   Neurological:  Negative for headaches.     Objective:      Vitals:    11/01/22 1005   BP: 132/63   Pulse: 78   Resp: 16   Temp: 98.1 °F (36.7 °C)   TempSrc: Oral   SpO2: 98%   Weight: " "90.7 kg (200 lb)   Height: 5' 10" (1.778 m)       Physical Exam   Constitutional: He is oriented to person, place, and time. He appears well-developed. He is cooperative.  Non-toxic appearance. He does not appear ill. No distress.   HENT:   Head: Normocephalic and atraumatic.   Ears:   Right Ear: Hearing, tympanic membrane, external ear and ear canal normal.   Left Ear: Hearing, tympanic membrane, external ear and ear canal normal.   Nose: Congestion present. No mucosal edema, rhinorrhea or nasal deformity. No epistaxis. Right sinus exhibits no maxillary sinus tenderness and no frontal sinus tenderness. Left sinus exhibits no maxillary sinus tenderness and no frontal sinus tenderness.      Comments: PND  Mouth/Throat: Uvula is midline and mucous membranes are normal. Mucous membranes are moist. No trismus in the jaw. Normal dentition. No uvula swelling. No oropharyngeal exudate or posterior oropharyngeal erythema. Oropharynx is clear.   Eyes: Conjunctivae and lids are normal. Pupils are equal, round, and reactive to light. Right eye exhibits no discharge. Left eye exhibits no discharge. No scleral icterus. Extraocular movement intact   Neck: Trachea normal and phonation normal. Neck supple. No neck rigidity present.   Cardiovascular: Normal rate, regular rhythm, normal heart sounds and normal pulses.   Pulmonary/Chest: Effort normal and breath sounds normal. No stridor. No respiratory distress. He has no wheezes. He exhibits no tenderness.   Abdominal: Normal appearance and bowel sounds are normal. He exhibits no distension and no mass. Soft. There is no abdominal tenderness. There is no rebound and no guarding.   Musculoskeletal: Normal range of motion.         General: No deformity. Normal range of motion.      Right lower leg: No edema.      Left lower leg: No edema.   Lymphadenopathy:     He has no cervical adenopathy.   Neurological: no focal deficit. He is alert, oriented to person, place, and time and at " baseline. He exhibits normal muscle tone. Coordination normal.   Skin: Skin is warm, dry, intact, not diaphoretic, not pale and no rash. Capillary refill takes less than 2 seconds.   Psychiatric: His speech is normal and behavior is normal. Judgment and thought content normal.   Nursing note and vitals reviewed.      Assessment:       1. Acute nasopharyngitis (common cold)    2. URI with cough and congestion    3. Acute sore throat    4. Postnasal drip        Results for orders placed or performed in visit on 11/01/22   POCT Influenza A/B MOLECULAR   Result Value Ref Range    POC Molecular Influenza A Ag Negative Negative, Not Reported    POC Molecular Influenza B Ag Negative Negative, Not Reported     Acceptable Yes        Plan:         Acute nasopharyngitis (common cold)  -     benzonatate (TESSALON) 100 MG capsule; Take 2 capsules (200 mg total) by mouth 3 (three) times daily as needed for Cough.  Dispense: 15 capsule; Refill: 0  -     fluticasone propionate (FLONASE) 50 mcg/actuation nasal spray; 1 spray (50 mcg total) by Each Nostril route once daily.  Dispense: 9.9 mL; Refill: 0    URI with cough and congestion  -     POCT Influenza A/B MOLECULAR    Acute sore throat    Postnasal drip            Patient Instructions   VIRAL URI: OVER THE COUNTER RECOMMENDATIONS/SUGGESTIONS--if needed      SORE THROAT:    You may gargle with hot salt water 4 times a day for the next 2 days and then you may also gargle diluted hydrogen peroxide once to twice daily to alleviate some of your throat discomfort.  Drink plenty of fluids, recommend warm tea with honey.     YOU MAY USE OVER-THE-COUNTER CEPACOL FOR SOOTHING OF YOUR THROAT.  You may wish to avoid spicy food, citrus fruits, and red sauces- as this may irritate the throat more.    You can also take a daily anti-histamine such as Zyrtec, Claritin, Xyzal, OR Allegra-IN DAYTIME; NON DROWSY) AND/OR Benadryl- AT NIGHT; DROWSY) to help with runny  nose/sneezing/sore throat/cough. Remember to switch antihistamines every 3 months, if taken daily.     COUGH:   Tessalon pearls as directed    Make sure you are getting rest and drinking lots of fluids.    You can use cough drops (recommend ricola lemon mint honey) or Cepacol to soothe your sore throat.     You can also take Elderberry and/or Emergen-C (vitamin C) to help boost your immune system.     You may use any of the over-the-counter cough suppressant combination medications such as: NyQuil, DayQuil, Mucinex (guaifenesin), Robitussin, Delsym (Bromfed), TheraFlu  Note:   -pseudoephedrine (behind the counter) is a decongestant. Pseudoephedrine 30 mg up to 240 mg/day. *BE AWARE- It can raise your blood pressure and give you palpitations.  -Mucinex (guaifenisin) is to break up mucus up to 2400mg/day to loosen any mucous.   -Mucinex DM pill has a cough suppressant that can be sedating. It can be used at night to stop the tickle at the back of your throat.  -Mucinex D (it has guaifenesin and a high dose of pseudoephedrine) which could be helpful in the mornings to help decongest.  -Nyquil at night is beneficial to help you get some rest, however it is sedating and it does have an antihistamine and tylenol.  -- you may use over-the-counter Coricidin HBP in the event that you have a history of high blood pressure    Honey is a natural cough suppressant that can be used.    If your symptoms do not improve, you should return to this clinic. If your symptoms worsen, go to the emergency room.         CONGESTION:  Make sure to stay well hydrated.    Use Nasal Saline to mechanically move any post nasal drip from your eustachian tube or from the back of your throat.    You may insert a whole garlic cloves into your nostrils and leave for 10-15 minutes. When you remove them, mucus will be pulled down. This may burn as garlic is strong.  Repeat as often as needed and able to tolerate.  Please do not use garlic if you have an  allergy to garlic.    FLONASE AS DIRECTED--    How do you use a Nasal Spray?    Make sure you understand your dosing instructions. Spray only the number of prescribed sprays in each nostril. Read the package instructions before using your spray the first time.    Most sprays suggest the following steps:    Wash your hands well.    Gently blow your nose to clear the passageway.    Shake the container several times.    Tilt your head slightly downward.  Use the opposite hand from the nostril you will be spraying to hold the spray bottle.    Block one nostril with your finger.  Insert the nasal applicator into the other nostril.    Aim the spray toward the outer wall of the nostril.  Inhale slowly through the nose and press the .    Breathe out and repeat to apply the prescribed number of sprays.  Repeat these steps for the other nostril.     Avoid sneezing or blowing your nose right after spraying.         PAIN/DISCOMFORT:  Tylenol up to 4,000 mg a day is safe for short periods and can be used for headache, body aches, pain, and fever. However in high doses and prolonged use it can cause liver irritation.    Ibuprofen is a non-steroidal anti-inflammatory that can be used for headache, body aches, pain, and fever. However it can also cause stomach irritation if over used.

## 2022-11-01 NOTE — PATIENT INSTRUCTIONS
VIRAL URI: OVER THE COUNTER RECOMMENDATIONS/SUGGESTIONS--if needed      SORE THROAT:    You may gargle with hot salt water 4 times a day for the next 2 days and then you may also gargle diluted hydrogen peroxide once to twice daily to alleviate some of your throat discomfort.  Drink plenty of fluids, recommend warm tea with honey.     YOU MAY USE OVER-THE-COUNTER CEPACOL FOR SOOTHING OF YOUR THROAT.  You may wish to avoid spicy food, citrus fruits, and red sauces- as this may irritate the throat more.    You can also take a daily anti-histamine such as Zyrtec, Claritin, Xyzal, OR Allegra-IN DAYTIME; NON DROWSY) AND/OR Benadryl- AT NIGHT; DROWSY) to help with runny nose/sneezing/sore throat/cough. Remember to switch antihistamines every 3 months, if taken daily.     COUGH:   Tessalon pearls as directed    Make sure you are getting rest and drinking lots of fluids.    You can use cough drops (recommend ricola lemon mint honey) or Cepacol to soothe your sore throat.     You can also take Elderberry and/or Emergen-C (vitamin C) to help boost your immune system.     You may use any of the over-the-counter cough suppressant combination medications such as: NyQuil, DayQuil, Mucinex (guaifenesin), Robitussin, Delsym (Bromfed), TheraFlu  Note:   -pseudoephedrine (behind the counter) is a decongestant. Pseudoephedrine 30 mg up to 240 mg/day. *BE AWARE- It can raise your blood pressure and give you palpitations.  -Mucinex (guaifenisin) is to break up mucus up to 2400mg/day to loosen any mucous.   -Mucinex DM pill has a cough suppressant that can be sedating. It can be used at night to stop the tickle at the back of your throat.  -Mucinex D (it has guaifenesin and a high dose of pseudoephedrine) which could be helpful in the mornings to help decongest.  -Nyquil at night is beneficial to help you get some rest, however it is sedating and it does have an antihistamine and tylenol.  -- you may use over-the-counter Coricidin HBP in  the event that you have a history of high blood pressure    Honey is a natural cough suppressant that can be used.    If your symptoms do not improve, you should return to this clinic. If your symptoms worsen, go to the emergency room.         CONGESTION:  Make sure to stay well hydrated.    Use Nasal Saline to mechanically move any post nasal drip from your eustachian tube or from the back of your throat.    You may insert a whole garlic cloves into your nostrils and leave for 10-15 minutes. When you remove them, mucus will be pulled down. This may burn as garlic is strong.  Repeat as often as needed and able to tolerate.  Please do not use garlic if you have an allergy to garlic.    FLONASE AS DIRECTED--    How do you use a Nasal Spray?    Make sure you understand your dosing instructions. Spray only the number of prescribed sprays in each nostril. Read the package instructions before using your spray the first time.    Most sprays suggest the following steps:    Wash your hands well.    Gently blow your nose to clear the passageway.    Shake the container several times.    Tilt your head slightly downward.  Use the opposite hand from the nostril you will be spraying to hold the spray bottle.    Block one nostril with your finger.  Insert the nasal applicator into the other nostril.    Aim the spray toward the outer wall of the nostril.  Inhale slowly through the nose and press the .    Breathe out and repeat to apply the prescribed number of sprays.  Repeat these steps for the other nostril.     Avoid sneezing or blowing your nose right after spraying.         PAIN/DISCOMFORT:  Tylenol up to 4,000 mg a day is safe for short periods and can be used for headache, body aches, pain, and fever. However in high doses and prolonged use it can cause liver irritation.      Ibuprofen is a non-steroidal anti-inflammatory that can be used for headache, body aches, pain, and fever. However it can also cause  stomach irritation if over used.        - You must understand that you have received an Urgent Care treatment only and that you may be released before all of your medical problems are known or treated.   - You, the patient, will arrange for follow up care as instructed with your primary care provider or recommended specialist.   - If your condition worsens or fails to improve we recommend that you receive another evaluation at the ER immediately or contact your PCP to discuss your concerns, or return here.   - Please do not drive or make any important decisions for 24 hours if you have received any pain medications, sedatives or mood altering drugs during your visit.    Disclaimer: This document was drafted with the use of a voice recognition device and is likely to have sound alike errors.

## 2022-11-04 ENCOUNTER — TELEPHONE (OUTPATIENT)
Dept: URGENT CARE | Facility: CLINIC | Age: 27
End: 2022-11-04
Payer: COMMERCIAL

## 2022-11-30 ENCOUNTER — OFFICE VISIT (OUTPATIENT)
Dept: UROLOGY | Facility: CLINIC | Age: 27
End: 2022-11-30
Payer: COMMERCIAL

## 2022-11-30 VITALS
BODY MASS INDEX: 28.6 KG/M2 | RESPIRATION RATE: 18 BRPM | SYSTOLIC BLOOD PRESSURE: 132 MMHG | DIASTOLIC BLOOD PRESSURE: 77 MMHG | HEIGHT: 70 IN | WEIGHT: 199.75 LBS | HEART RATE: 97 BPM

## 2022-11-30 DIAGNOSIS — N52.9 ERECTILE DYSFUNCTION, UNSPECIFIED ERECTILE DYSFUNCTION TYPE: ICD-10-CM

## 2022-11-30 DIAGNOSIS — E29.1 HYPOGONADISM IN MALE: Primary | ICD-10-CM

## 2022-11-30 PROCEDURE — 3008F BODY MASS INDEX DOCD: CPT | Mod: CPTII,S$GLB,, | Performed by: UROLOGY

## 2022-11-30 PROCEDURE — 99999 PR PBB SHADOW E&M-EST. PATIENT-LVL III: ICD-10-PCS | Mod: PBBFAC,,, | Performed by: UROLOGY

## 2022-11-30 PROCEDURE — 3075F PR MOST RECENT SYSTOLIC BLOOD PRESS GE 130-139MM HG: ICD-10-PCS | Mod: CPTII,S$GLB,, | Performed by: UROLOGY

## 2022-11-30 PROCEDURE — 3008F PR BODY MASS INDEX (BMI) DOCUMENTED: ICD-10-PCS | Mod: CPTII,S$GLB,, | Performed by: UROLOGY

## 2022-11-30 PROCEDURE — 1159F MED LIST DOCD IN RCRD: CPT | Mod: CPTII,S$GLB,, | Performed by: UROLOGY

## 2022-11-30 PROCEDURE — 99999 PR PBB SHADOW E&M-EST. PATIENT-LVL III: CPT | Mod: PBBFAC,,, | Performed by: UROLOGY

## 2022-11-30 PROCEDURE — 3044F PR MOST RECENT HEMOGLOBIN A1C LEVEL <7.0%: ICD-10-PCS | Mod: CPTII,S$GLB,, | Performed by: UROLOGY

## 2022-11-30 PROCEDURE — 99203 OFFICE O/P NEW LOW 30 MIN: CPT | Mod: S$GLB,,, | Performed by: UROLOGY

## 2022-11-30 PROCEDURE — 99203 PR OFFICE/OUTPT VISIT, NEW, LEVL III, 30-44 MIN: ICD-10-PCS | Mod: S$GLB,,, | Performed by: UROLOGY

## 2022-11-30 PROCEDURE — 3078F PR MOST RECENT DIASTOLIC BLOOD PRESSURE < 80 MM HG: ICD-10-PCS | Mod: CPTII,S$GLB,, | Performed by: UROLOGY

## 2022-11-30 PROCEDURE — 3044F HG A1C LEVEL LT 7.0%: CPT | Mod: CPTII,S$GLB,, | Performed by: UROLOGY

## 2022-11-30 PROCEDURE — 3078F DIAST BP <80 MM HG: CPT | Mod: CPTII,S$GLB,, | Performed by: UROLOGY

## 2022-11-30 PROCEDURE — 1159F PR MEDICATION LIST DOCUMENTED IN MEDICAL RECORD: ICD-10-PCS | Mod: CPTII,S$GLB,, | Performed by: UROLOGY

## 2022-11-30 PROCEDURE — 3075F SYST BP GE 130 - 139MM HG: CPT | Mod: CPTII,S$GLB,, | Performed by: UROLOGY

## 2022-11-30 NOTE — PROGRESS NOTES
Chief Complaint   Patient presents with    Erectile Dysfunction       Referring Provider: Dr. Pio Gomez     History of Present Illness:   Bennett Rubin is a 27 y.o. male here for evaluation of Erectile Dysfunction    11/30/22- 26yo male here for evaluation of low T. He reports that he took anabolic steroids (350mg weekly) since March up until about a couple of weeks ago. Also was taking masetron. Pt reports that he is concerned about not being able to have children. He does have 1 3 year old child. He wants to preserve his fertility. Getting erections is somewhat more difficult, but not always an issue.       Review of Systems   Constitutional:  Negative for chills and fever.   Respiratory:  Negative for shortness of breath.    Cardiovascular:  Negative for chest pain.   Genitourinary:  Negative for difficulty urinating.   All other systems reviewed and are negative.      History reviewed. No pertinent past medical history.    Past Surgical History:   Procedure Laterality Date    CYST REMOVAL Left 2005    cyst removal under left nipple    RECONSTRUCTION OF FINGER Left     pinky finger       Family History   Problem Relation Age of Onset    Hypertension Father     Dementia Maternal Grandmother     Heart attack Paternal Grandmother     Heart attack Paternal Grandfather        Social History     Tobacco Use    Smoking status: Every Day     Types: Vaping with nicotine    Smokeless tobacco: Never   Substance Use Topics    Alcohol use: Not Currently    Drug use: Yes     Types: Anabolic steroids       Current Outpatient Medications   Medication Sig Dispense Refill    multivitamin capsule Take 1 capsule by mouth once daily.      clindamycin (CLEOCIN T) 1 % lotion AAA bid.  If doing well, qd only. (Patient not taking: Reported on 11/1/2022) 120 mL 4     No current facility-administered medications for this visit.       Review of patient's allergies indicates:  No Known Allergies    Physical Exam  Vitals:    11/30/22 1306    BP: 132/77   Pulse: 97   Resp: 18       General: Well-developed, well-nourished in no acute distress  HEENT: Normocephalic, atraumatic, Extraocular movements intact  Neck: supple, trachea midline, no cervical or supraclavicular lymphadenopathy  Respirations: even and unlabored  Back: midline spine, no CVA tenderness  Abdomen: soft, Non-tender, non-distended, no organomegaly or palpable masses, no rebound or guarding  : circumcised male phallus without lesions, orthotopic urethral meatus, no inguinal hernia, no inguinal lymphadenopathy, no scrotal lesions, testicles descended bilaterally without mass or tenderness  Extremities: atraumatic, moves all equally, no clubbing, cyanosis or edema  Psych: normal affect  Skin: warm and dry, no lesions  Neuro: Alert and oriented, Cranial nerves II-XII grossly intact    Urinalysis  pH, UA   Date Value Ref Range Status   05/23/2022 5.0  Final         Assessment:   1. Hypogonadism in male  Testosterone      2. Erectile dysfunction, unspecified erectile dysfunction type  Ambulatory referral/consult to Urology          Plan:  Hypogonadism in male  -     Testosterone; Future; Expected date: 11/30/2022    Erectile dysfunction, unspecified erectile dysfunction type  -     Ambulatory referral/consult to Urology    Patient understands that anabolic steroids will decrease fertility, and sometimes this could be irreversible. We discussed that his sperm count is likely decreased currently due to use of anabolic steroids, and this may improve with cessation. He was instructed to discontinue any use of anabolic steroids and we will check his testosterone level in a month to determine if his natural testosterone production will rebound. Would wait at least 3 months to do a semen analysis.

## 2022-12-23 ENCOUNTER — OFFICE VISIT (OUTPATIENT)
Dept: URGENT CARE | Facility: CLINIC | Age: 27
End: 2022-12-23
Payer: COMMERCIAL

## 2022-12-23 VITALS
TEMPERATURE: 99 F | HEART RATE: 99 BPM | BODY MASS INDEX: 28.49 KG/M2 | DIASTOLIC BLOOD PRESSURE: 82 MMHG | OXYGEN SATURATION: 96 % | WEIGHT: 199 LBS | RESPIRATION RATE: 20 BRPM | SYSTOLIC BLOOD PRESSURE: 156 MMHG | HEIGHT: 70 IN

## 2022-12-23 DIAGNOSIS — R03.0 ELEVATED BLOOD PRESSURE READING: ICD-10-CM

## 2022-12-23 DIAGNOSIS — Z20.2 STD EXPOSURE: Primary | ICD-10-CM

## 2022-12-23 PROCEDURE — 1160F RVW MEDS BY RX/DR IN RCRD: CPT | Mod: CPTII,S$GLB,, | Performed by: NURSE PRACTITIONER

## 2022-12-23 PROCEDURE — 87491 CHLMYD TRACH DNA AMP PROBE: CPT | Performed by: NURSE PRACTITIONER

## 2022-12-23 PROCEDURE — 3079F DIAST BP 80-89 MM HG: CPT | Mod: CPTII,S$GLB,, | Performed by: NURSE PRACTITIONER

## 2022-12-23 PROCEDURE — 3079F PR MOST RECENT DIASTOLIC BLOOD PRESSURE 80-89 MM HG: ICD-10-PCS | Mod: CPTII,S$GLB,, | Performed by: NURSE PRACTITIONER

## 2022-12-23 PROCEDURE — 96372 THER/PROPH/DIAG INJ SC/IM: CPT | Mod: S$GLB,,, | Performed by: NURSE PRACTITIONER

## 2022-12-23 PROCEDURE — 87591 N.GONORRHOEAE DNA AMP PROB: CPT | Performed by: NURSE PRACTITIONER

## 2022-12-23 PROCEDURE — 3044F PR MOST RECENT HEMOGLOBIN A1C LEVEL <7.0%: ICD-10-PCS | Mod: CPTII,S$GLB,, | Performed by: NURSE PRACTITIONER

## 2022-12-23 PROCEDURE — 1160F PR REVIEW ALL MEDS BY PRESCRIBER/CLIN PHARMACIST DOCUMENTED: ICD-10-PCS | Mod: CPTII,S$GLB,, | Performed by: NURSE PRACTITIONER

## 2022-12-23 PROCEDURE — 3008F BODY MASS INDEX DOCD: CPT | Mod: CPTII,S$GLB,, | Performed by: NURSE PRACTITIONER

## 2022-12-23 PROCEDURE — 3077F PR MOST RECENT SYSTOLIC BLOOD PRESSURE >= 140 MM HG: ICD-10-PCS | Mod: CPTII,S$GLB,, | Performed by: NURSE PRACTITIONER

## 2022-12-23 PROCEDURE — 3008F PR BODY MASS INDEX (BMI) DOCUMENTED: ICD-10-PCS | Mod: CPTII,S$GLB,, | Performed by: NURSE PRACTITIONER

## 2022-12-23 PROCEDURE — 3044F HG A1C LEVEL LT 7.0%: CPT | Mod: CPTII,S$GLB,, | Performed by: NURSE PRACTITIONER

## 2022-12-23 PROCEDURE — 87661 TRICHOMONAS VAGINALIS AMPLIF: CPT | Performed by: NURSE PRACTITIONER

## 2022-12-23 PROCEDURE — 96372 PR INJECTION,THERAP/PROPH/DIAG2ST, IM OR SUBCUT: ICD-10-PCS | Mod: S$GLB,,, | Performed by: NURSE PRACTITIONER

## 2022-12-23 PROCEDURE — 3077F SYST BP >= 140 MM HG: CPT | Mod: CPTII,S$GLB,, | Performed by: NURSE PRACTITIONER

## 2022-12-23 PROCEDURE — 1159F PR MEDICATION LIST DOCUMENTED IN MEDICAL RECORD: ICD-10-PCS | Mod: CPTII,S$GLB,, | Performed by: NURSE PRACTITIONER

## 2022-12-23 PROCEDURE — 1159F MED LIST DOCD IN RCRD: CPT | Mod: CPTII,S$GLB,, | Performed by: NURSE PRACTITIONER

## 2022-12-23 PROCEDURE — 99214 OFFICE O/P EST MOD 30 MIN: CPT | Mod: 25,S$GLB,, | Performed by: NURSE PRACTITIONER

## 2022-12-23 PROCEDURE — 99214 PR OFFICE/OUTPT VISIT, EST, LEVL IV, 30-39 MIN: ICD-10-PCS | Mod: 25,S$GLB,, | Performed by: NURSE PRACTITIONER

## 2022-12-23 RX ORDER — DOXYCYCLINE HYCLATE 100 MG
100 TABLET ORAL 2 TIMES DAILY
Qty: 14 TABLET | Refills: 0 | Status: SHIPPED | OUTPATIENT
Start: 2022-12-23 | End: 2022-12-30

## 2022-12-23 RX ORDER — CEFTRIAXONE 500 MG/1
500 INJECTION, POWDER, FOR SOLUTION INTRAMUSCULAR; INTRAVENOUS
Status: COMPLETED | OUTPATIENT
Start: 2022-12-23 | End: 2022-12-23

## 2022-12-23 RX ADMIN — CEFTRIAXONE 500 MG: 500 INJECTION, POWDER, FOR SOLUTION INTRAMUSCULAR; INTRAVENOUS at 08:12

## 2022-12-23 NOTE — PATIENT INSTRUCTIONS
1.  Take all medications as directed. If you have been prescribed antibiotics, make sure to complete them.   2.  Rest and keep yourself/patient well hydrated. For adults, it is recommended to drink at least 8-10 glasses of water daily.   3.  For patients above 6 months of age who are not allergic to and are not on anticoagulants, you can alternate Tylenol and Motrin every 4-6 hours for fever above 100.4F and/or pain.  For patients less than 6 months of age, allergic to or intolerant to NSAIDS, have gastritis, gastric ulcers, or history of GI bleeds, are pregnant, or are on anticoagulant therapy, you can take Tylenol every 4 hours as needed for fever above 100.4F and/or pain.   4. You should schedule a follow-up appointment with your Primary Care Provider/Pediatrician for recheck in 2-3 days or as directed at this visit.   5.  If your condition fails to improve in a timely manner, you should receive another evaluation by your Primary Care Provider/Pediatrician to discuss your concerns or return to urgent care for a recheck.  If your condition worsens at any time, you should report immediately to your nearest Emergency Department for further evaluation. **You must understand that you have received Urgent Care treatment only and that you may be released before all of your medical problems are known or treated. You, the patient, are responsible to arrange for follow-up care as instructed.     Patient Education       STD Prevention   About this topic   Sexually-transmitted diseases or STDs are infections you catch during sexual contact. This includes vaginal, oral, and anal sex. You may also get it by coming in contact with skin, genitals, mouth, rectum, or body fluids of an infected person. A person with an STD may not have any signs or know they have it. STDs can be very serious and have long-term health effects. STDs can cause cancer, blindness, or not being able to have children.  Bacteria, viruses, or parasites can  cause STDs. Bacterial STDs are treated with antibiotics. Only the signs of viral STDs are treated. Common STDs include:  Chlamydia   Gonorrhea  Syphilis  Human immunodeficiency virus (HIV)  Herpes simplex  Human papillomavirus (HPV)  Hepatitis B and C  Trichomonas  STDs may cause signs like:  Pain when passing urine  Sores or genital warts  Unusual discharge from penis or vagina  Itching on your inner thighs, anus, or genitals  Abnormal menstrual bleeding  Pain or bleeding during sex  Swelling of testicles  Fever  Muscle or joint pain  These signs may come and go. It is important to see your doctor even if you think the signs are gone.  General   Learn about your health, your body, sex, love, and relationships. These are all important parts of sexual health.  Learn about STDs. Find reliable information about STDs.  Know the right names for both male and female body parts.  Find information about the kinds of infections you could get.  Know how STDs spread as well as the signs and treatment.     What will the results be?   You may be able to protect yourself from getting STDs.  You may be able to prevent long-term effects on your health.  If you are pregnant, you may be able to prevent giving your unborn baby a STD.  Will there be any other care needed?   Ask your doctor if there are shots or pills you can take to prevent a STD.  Know your STD status. Get tested and have your partner tested.  What can be done to prevent this health problem?   The only sure way to keep from getting or passing on a sexually-transmitted infection is to not have sexual contact with anyone.  Even if you do not have any signs of illness, you may spread an STD.  Having an STD can increase your chances of catching HIV, the virus that causes AIDS.  If you have any type of sexual contact, use latex condoms each time to reduce the spread of infection. Use a condom with each partner every time, from the start of sex to the end.  Avoid contact  with any sex partner known to have an infection or who has signs of an infection like genital sores or warts.  Avoid multiple sex partners. A long-term monogamous relationship with a partner who has tested negative and is known to have no infection is the safest partner.  If you are pregnant, get tested and get prompt treatment for an STD. This will help avoid passing it to your baby.  Avoid using drugs or too much alcohol. Either of these can lead to risky behavior like unprotected sex.  Talk to your partner about STDs before you have sex. Talk about having safe sex and if your relationship is monogamous.  Wash your hands and genitals with soap and water after sex.  See your doctor for regular exams and check-ups for STDs.  Talk to your doctor about available vaccines for prevention of certain STDs.  Where can I learn more?   [employer]  http://familydoctor.org/familydoctor/en/diseases-conditions/sexually-transmitted-infections/prevention.printerview.all.html   Centers for Disease Control  http://www.cdc.gov/std/prevention/default.htm   Last Reviewed Date   2021-03-31  Consumer Information Use and Disclaimer   This information is not specific medical advice and does not replace information you receive from your health care provider. This is only a brief summary of general information. It does NOT include all information about conditions, illnesses, injuries, tests, procedures, treatments, therapies, discharge instructions or life-style choices that may apply to you. You must talk with your health care provider for complete information about your health and treatment options. This information should not be used to decide whether or not to accept your health care providers advice, instructions or recommendations. Only your health care provider has the knowledge and training to provide advice that is right for you.  Copyright   Copyright © 2021 [employer] and its affiliates and/or  licensors. All rights reserved.

## 2022-12-23 NOTE — PROGRESS NOTES
"Subjective:       Patient ID: Bennett Rubin is a 27 y.o. male.    Vitals:  height is 5' 10" (1.778 m) and weight is 90.3 kg (199 lb). His oral temperature is 98.5 °F (36.9 °C). His blood pressure is 156/82 (abnormal) and his pulse is 99. His respiration is 20 and oxygen saturation is 96%.     Chief Complaint: Exposure to STD    27 year old male reports recent unprotected sex and exposure to STD. Pt reports pelvic pain, burning with urinating, and clear discharge. Pt reports he has had gonorrhea and chlamydia in the past and symptoms are similar.    Exposure to STD  The patient's primary symptoms include pelvic pain (burnig pain when urinating) and penile discharge. The patient's pertinent negatives include no genital itching. This is a new problem. The current episode started in the past 7 days. The problem occurs constantly. The problem has been unchanged. The pain is mild. Pertinent negatives include no urgency. Nothing aggravates the symptoms. He has tried nothing for the symptoms. He is sexually active. He never uses condoms. Yes, his partner has an STD.     Constitution: Negative.   Neck: neck negative.   Cardiovascular: Negative.    Respiratory: Negative.     Genitourinary:  Positive for penile discharge and pelvic pain (burnig pain when urinating). Negative for urgency.     Objective:      Physical Exam   Constitutional: He is oriented to person, place, and time. He appears well-developed. He is cooperative.  Non-toxic appearance. He does not appear ill. No distress.   HENT:   Head: Normocephalic and atraumatic.   Ears:   Right Ear: Hearing and external ear normal.   Left Ear: Hearing and external ear normal.   Nose: Nose normal. No mucosal edema, rhinorrhea or nasal deformity. No epistaxis. Right sinus exhibits no maxillary sinus tenderness and no frontal sinus tenderness. Left sinus exhibits no maxillary sinus tenderness and no frontal sinus tenderness.   Mouth/Throat: Uvula is midline, oropharynx is clear and " moist and mucous membranes are normal. No trismus in the jaw. Normal dentition. No uvula swelling. No posterior oropharyngeal erythema.   Eyes: Conjunctivae and lids are normal. Right eye exhibits no discharge. Left eye exhibits no discharge. No scleral icterus.   Neck: Trachea normal and phonation normal. Neck supple.   Cardiovascular: Normal rate and normal pulses.   Pulmonary/Chest: Effort normal. No respiratory distress.   Abdominal: Normal appearance and bowel sounds are normal. He exhibits no distension and no mass. Soft. There is no abdominal tenderness. There is no rebound, no guarding, no left CVA tenderness and no right CVA tenderness.   Musculoskeletal: Normal range of motion.         General: No deformity. Normal range of motion.   Neurological: He is alert and oriented to person, place, and time. He exhibits normal muscle tone. Coordination normal.   Skin: Skin is warm, dry, intact, not diaphoretic and not pale.   Psychiatric: His speech is normal and behavior is normal. Judgment and thought content normal.   Nursing note and vitals reviewed.      Assessment:       1. STD exposure    2. Elevated blood pressure reading          Plan:         STD exposure  -     C. trachomatis/N. gonorrhoeae by AMP DNA [facility]; Urine  -     Trichomonas vaginalis, RNA, Qual, Urine  -     cefTRIAXone injection 500 mg  -     doxycycline (VIBRA-TABS) 100 MG tablet; Take 1 tablet (100 mg total) by mouth 2 (two) times daily. for 7 days  Dispense: 14 tablet; Refill: 0    Elevated blood pressure reading    Discussed with patient that blood pressure today was above 120/80 and that illness, anxiety or pain can cause a temporary rise in blood pressure and later returns to normal. Instructed to have blood pressure measured again within the next few days and to follow up with PCP for further evaluation if BP continues to be elevated .    Patient Instructions   1.  Take all medications as directed. If you have been prescribed  antibiotics, make sure to complete them.   2.  Rest and keep yourself/patient well hydrated. For adults, it is recommended to drink at least 8-10 glasses of water daily.   3.  For patients above 6 months of age who are not allergic to and are not on anticoagulants, you can alternate Tylenol and Motrin every 4-6 hours for fever above 100.4F and/or pain.  For patients less than 6 months of age, allergic to or intolerant to NSAIDS, have gastritis, gastric ulcers, or history of GI bleeds, are pregnant, or are on anticoagulant therapy, you can take Tylenol every 4 hours as needed for fever above 100.4F and/or pain.   4. You should schedule a follow-up appointment with your Primary Care Provider/Pediatrician for recheck in 2-3 days or as directed at this visit.   5.  If your condition fails to improve in a timely manner, you should receive another evaluation by your Primary Care Provider/Pediatrician to discuss your concerns or return to urgent care for a recheck.  If your condition worsens at any time, you should report immediately to your nearest Emergency Department for further evaluation. **You must understand that you have received Urgent Care treatment only and that you may be released before all of your medical problems are known or treated. You, the patient, are responsible to arrange for follow-up care as instructed.     Patient Education       STD Prevention   About this topic   Sexually-transmitted diseases or STDs are infections you catch during sexual contact. This includes vaginal, oral, and anal sex. You may also get it by coming in contact with skin, genitals, mouth, rectum, or body fluids of an infected person. A person with an STD may not have any signs or know they have it. STDs can be very serious and have long-term health effects. STDs can cause cancer, blindness, or not being able to have children.  Bacteria, viruses, or parasites can cause STDs. Bacterial STDs are treated with antibiotics. Only the  signs of viral STDs are treated. Common STDs include:  Chlamydia   Gonorrhea  Syphilis  Human immunodeficiency virus (HIV)  Herpes simplex  Human papillomavirus (HPV)  Hepatitis B and C  Trichomonas  STDs may cause signs like:  Pain when passing urine  Sores or genital warts  Unusual discharge from penis or vagina  Itching on your inner thighs, anus, or genitals  Abnormal menstrual bleeding  Pain or bleeding during sex  Swelling of testicles  Fever  Muscle or joint pain  These signs may come and go. It is important to see your doctor even if you think the signs are gone.  General   Learn about your health, your body, sex, love, and relationships. These are all important parts of sexual health.  Learn about STDs. Find reliable information about STDs.  Know the right names for both male and female body parts.  Find information about the kinds of infections you could get.  Know how STDs spread as well as the signs and treatment.     What will the results be?   You may be able to protect yourself from getting STDs.  You may be able to prevent long-term effects on your health.  If you are pregnant, you may be able to prevent giving your unborn baby a STD.  Will there be any other care needed?   Ask your doctor if there are shots or pills you can take to prevent a STD.  Know your STD status. Get tested and have your partner tested.  What can be done to prevent this health problem?   The only sure way to keep from getting or passing on a sexually-transmitted infection is to not have sexual contact with anyone.  Even if you do not have any signs of illness, you may spread an STD.  Having an STD can increase your chances of catching HIV, the virus that causes AIDS.  If you have any type of sexual contact, use latex condoms each time to reduce the spread of infection. Use a condom with each partner every time, from the start of sex to the end.  Avoid contact with any sex partner known to have an infection or who has signs of  an infection like genital sores or warts.  Avoid multiple sex partners. A long-term monogamous relationship with a partner who has tested negative and is known to have no infection is the safest partner.  If you are pregnant, get tested and get prompt treatment for an STD. This will help avoid passing it to your baby.  Avoid using drugs or too much alcohol. Either of these can lead to risky behavior like unprotected sex.  Talk to your partner about STDs before you have sex. Talk about having safe sex and if your relationship is monogamous.  Wash your hands and genitals with soap and water after sex.  See your doctor for regular exams and check-ups for STDs.  Talk to your doctor about available vaccines for prevention of certain STDs.  Where can I learn more?   American Academy [employer]  http://familydoctor.org/familydoctor/en/diseases-conditions/sexually-transmitted-infections/prevention.printerview.all.html   Centers for Disease Control  http://www.cdc.gov/std/prevention/default.htm   Last Reviewed Date   2021-03-31  Consumer Information Use and Disclaimer   This information is not specific medical advice and does not replace information you receive from your health care provider. This is only a brief summary of general information. It does NOT include all information about conditions, illnesses, injuries, tests, procedures, treatments, therapies, discharge instructions or life-style choices that may apply to you. You must talk with your health care provider for complete information about your health and treatment options. This information should not be used to decide whether or not to accept your health care providers advice, instructions or recommendations. Only your health care provider has the knowledge and training to provide advice that is right for you.  Copyright   Copyright © 2021 [employer] and its affiliates and/or licensors. All rights reserved.

## 2022-12-24 LAB
C TRACH DNA SPEC QL NAA+PROBE: DETECTED
N GONORRHOEA DNA SPEC QL NAA+PROBE: NOT DETECTED

## 2022-12-29 LAB
T VAGINALIS RRNA SPEC QL NAA+PROBE: NOT DETECTED
TRICHOMONAS VAGINALIS RNA, QUAL, SOURCE: NORMAL

## 2023-01-04 ENCOUNTER — TELEPHONE (OUTPATIENT)
Dept: URGENT CARE | Facility: CLINIC | Age: 28
End: 2023-01-04
Payer: COMMERCIAL

## 2023-01-04 NOTE — TELEPHONE ENCOUNTER
Patient informed of GC/CT results as well as trichomonas. Able to view on mychart. Treated appropriately. No further treatment indicated. States understanding.

## 2023-01-09 PROBLEM — Z00.00 WELLNESS EXAMINATION: Status: RESOLVED | Noted: 2022-10-06 | Resolved: 2023-01-09

## 2023-02-02 ENCOUNTER — OFFICE VISIT (OUTPATIENT)
Dept: URGENT CARE | Facility: CLINIC | Age: 28
End: 2023-02-02
Payer: COMMERCIAL

## 2023-02-02 VITALS
HEIGHT: 70 IN | WEIGHT: 200 LBS | DIASTOLIC BLOOD PRESSURE: 67 MMHG | SYSTOLIC BLOOD PRESSURE: 142 MMHG | OXYGEN SATURATION: 100 % | TEMPERATURE: 98 F | RESPIRATION RATE: 15 BRPM | BODY MASS INDEX: 28.63 KG/M2 | HEART RATE: 100 BPM

## 2023-02-02 DIAGNOSIS — Z20.2 EXPOSURE TO STD: ICD-10-CM

## 2023-02-02 DIAGNOSIS — R36.9 DISCHARGE FROM PENIS: Primary | ICD-10-CM

## 2023-02-02 PROCEDURE — 3077F SYST BP >= 140 MM HG: CPT | Mod: CPTII,S$GLB,, | Performed by: NURSE PRACTITIONER

## 2023-02-02 PROCEDURE — 96372 PR INJECTION,THERAP/PROPH/DIAG2ST, IM OR SUBCUT: ICD-10-PCS | Mod: S$GLB,,, | Performed by: FAMILY MEDICINE

## 2023-02-02 PROCEDURE — 1159F MED LIST DOCD IN RCRD: CPT | Mod: CPTII,S$GLB,, | Performed by: NURSE PRACTITIONER

## 2023-02-02 PROCEDURE — 1160F RVW MEDS BY RX/DR IN RCRD: CPT | Mod: CPTII,S$GLB,, | Performed by: NURSE PRACTITIONER

## 2023-02-02 PROCEDURE — 3008F BODY MASS INDEX DOCD: CPT | Mod: CPTII,S$GLB,, | Performed by: NURSE PRACTITIONER

## 2023-02-02 PROCEDURE — 1160F PR REVIEW ALL MEDS BY PRESCRIBER/CLIN PHARMACIST DOCUMENTED: ICD-10-PCS | Mod: CPTII,S$GLB,, | Performed by: NURSE PRACTITIONER

## 2023-02-02 PROCEDURE — 87661 TRICHOMONAS VAGINALIS AMPLIF: CPT | Performed by: NURSE PRACTITIONER

## 2023-02-02 PROCEDURE — 3077F PR MOST RECENT SYSTOLIC BLOOD PRESSURE >= 140 MM HG: ICD-10-PCS | Mod: CPTII,S$GLB,, | Performed by: NURSE PRACTITIONER

## 2023-02-02 PROCEDURE — 3078F DIAST BP <80 MM HG: CPT | Mod: CPTII,S$GLB,, | Performed by: NURSE PRACTITIONER

## 2023-02-02 PROCEDURE — 87591 N.GONORRHOEAE DNA AMP PROB: CPT | Performed by: NURSE PRACTITIONER

## 2023-02-02 PROCEDURE — 99213 PR OFFICE/OUTPT VISIT, EST, LEVL III, 20-29 MIN: ICD-10-PCS | Mod: 25,S$GLB,, | Performed by: NURSE PRACTITIONER

## 2023-02-02 PROCEDURE — 1159F PR MEDICATION LIST DOCUMENTED IN MEDICAL RECORD: ICD-10-PCS | Mod: CPTII,S$GLB,, | Performed by: NURSE PRACTITIONER

## 2023-02-02 PROCEDURE — 3078F PR MOST RECENT DIASTOLIC BLOOD PRESSURE < 80 MM HG: ICD-10-PCS | Mod: CPTII,S$GLB,, | Performed by: NURSE PRACTITIONER

## 2023-02-02 PROCEDURE — 3008F PR BODY MASS INDEX (BMI) DOCUMENTED: ICD-10-PCS | Mod: CPTII,S$GLB,, | Performed by: NURSE PRACTITIONER

## 2023-02-02 PROCEDURE — 96372 THER/PROPH/DIAG INJ SC/IM: CPT | Mod: S$GLB,,, | Performed by: FAMILY MEDICINE

## 2023-02-02 PROCEDURE — 99213 OFFICE O/P EST LOW 20 MIN: CPT | Mod: 25,S$GLB,, | Performed by: NURSE PRACTITIONER

## 2023-02-02 RX ORDER — DOXYCYCLINE HYCLATE 100 MG
100 TABLET ORAL 2 TIMES DAILY
Qty: 14 TABLET | Refills: 0 | Status: SHIPPED | OUTPATIENT
Start: 2023-02-02 | End: 2023-02-09

## 2023-02-02 RX ORDER — CEFTRIAXONE 500 MG/1
500 INJECTION, POWDER, FOR SOLUTION INTRAMUSCULAR; INTRAVENOUS
Status: COMPLETED | OUTPATIENT
Start: 2023-02-02 | End: 2023-02-02

## 2023-02-02 RX ADMIN — CEFTRIAXONE 500 MG: 500 INJECTION, POWDER, FOR SOLUTION INTRAMUSCULAR; INTRAVENOUS at 06:02

## 2023-02-02 NOTE — PROGRESS NOTES
"Subjective:       Patient ID: Bennett Rubin is a 27 y.o. male.    Vitals:  height is 5' 10" (1.778 m) and weight is 90.7 kg (200 lb). His tympanic temperature is 97.8 °F (36.6 °C). His blood pressure is 142/67 (abnormal) and his pulse is 100. His respiration is 15 and oxygen saturation is 100%.     Chief Complaint: Exposure to STD          Patient is a 27-year-old male who presents to urgent care for evaluation painful urination and penile discharge.  Patient reported having a history of chlamydia and the symptoms are the same. Denies associated symptoms genital lesions, fever or chills.     Exposure to STD  The patient's primary symptoms include penile discharge and penile pain. The patient's pertinent negatives include no genital injury, genital itching, genital lesions, pelvic pain, priapism, scrotal swelling or testicular pain. This is a new problem. The current episode started in the past 7 days. The problem occurs constantly. The problem has been gradually worsening. Pertinent negatives include no abdominal pain, anorexia, chest pain, chills, constipation, coughing, diarrhea, discolored urine, dysuria, fever, flank pain, frequency, headaches, hematuria, hesitancy, joint pain, joint swelling, nausea, painful intercourse, rash, shortness of breath, sore throat, urgency, urinary retention or vomiting. The penile discharge was White, clear and thick. It is possible that his partner has an STD. His past medical history is significant for chlamydia.     Constitution: Negative for chills and fever.   HENT:  Negative for sore throat.    Cardiovascular:  Negative for chest pain.   Respiratory:  Negative for cough and shortness of breath.    Gastrointestinal:  Negative for abdominal pain, nausea, vomiting, constipation and diarrhea.   Genitourinary:  Positive for penile discharge and penile pain. Negative for dysuria, frequency, urgency, flank pain, scrotal swelling, testicular pain and pelvic pain.   Skin:  Negative for " rash.   Neurological:  Negative for headaches.     Objective:      Physical Exam   Constitutional: He is oriented to person, place, and time. He appears well-developed. He is cooperative.   HENT:   Head: Normocephalic and atraumatic.   Ears:   Right Ear: Hearing, tympanic membrane, external ear and ear canal normal.   Left Ear: Hearing, tympanic membrane, external ear and ear canal normal.   Nose: Nose normal. No mucosal edema or nasal deformity. No epistaxis. Right sinus exhibits no maxillary sinus tenderness and no frontal sinus tenderness. Left sinus exhibits no maxillary sinus tenderness and no frontal sinus tenderness.   Mouth/Throat: Uvula is midline, oropharynx is clear and moist and mucous membranes are normal. No trismus in the jaw. Normal dentition. No uvula swelling.   Eyes: Conjunctivae and lids are normal.   Neck: Trachea normal and phonation normal. Neck supple.   Cardiovascular: Normal rate, regular rhythm, normal heart sounds and normal pulses.   Pulmonary/Chest: Effort normal and breath sounds normal.   Abdominal: Normal appearance and bowel sounds are normal. Soft.   Musculoskeletal: Normal range of motion.         General: Normal range of motion.   Neurological: He is alert and oriented to person, place, and time. He exhibits normal muscle tone.   Skin: Skin is warm, dry and intact.   Psychiatric: His speech is normal and behavior is normal. Judgment and thought content normal.   Nursing note and vitals reviewed.      Assessment:       1. Discharge from penis    2. Exposure to STD          Plan:         Discharge from penis  -     C. trachomatis/N. gonorrhoeae by AMP DNA [facility]; Urine  -     Trichomonas vaginalis, RNA, Qual, Urine  -     cefTRIAXone injection 500 mg  -     doxycycline (VIBRA-TABS) 100 MG tablet; Take 1 tablet (100 mg total) by mouth 2 (two) times daily. for 7 days  Dispense: 14 tablet; Refill: 0    Exposure to STD                 Patient presents to urgent care for evaluation for  penile discharge. Associated symptoms include pain with urination. Will check for GC, trichomonas and treat symptoms.        Sexually-transmitted diseases or STDs are infections you catch during sexual contact. This includes vaginal, oral, and anal sex. You may also get it by coming in contact with skin, genitals, mouth, rectum, or body fluids of an infected person. A person with an STD may not have any signs or know they have it. STDs can be very serious and have long-term health effects. STDs can cause cancer, blindness, or not being able to have children.  Bacteria, viruses, or parasites can cause STDs. Bacterial STDs are treated with antibiotics. Only the signs of viral STDs are treated. Common STDs include:  Chlamydia   Gonorrhea  Syphilis  Human immunodeficiency virus (HIV)  Herpes simplex  Human papillomavirus (HPV)  Hepatitis B and C  Trichomonas  STDs may cause signs like:  Pain when passing urine  Sores or genital warts  Unusual discharge from penis or vagina  Itching on your inner thighs, anus, or genitals  Abnormal menstrual bleeding  Pain or bleeding during sex  Swelling of testicles  Fever  Muscle or joint pain  These signs may come and go. It is important to see your doctor even if you think the signs are gone.

## 2023-02-04 LAB
C TRACH DNA SPEC QL NAA+PROBE: NOT DETECTED
N GONORRHOEA DNA SPEC QL NAA+PROBE: NOT DETECTED

## 2023-02-06 ENCOUNTER — TELEPHONE (OUTPATIENT)
Dept: URGENT CARE | Facility: CLINIC | Age: 28
End: 2023-02-06
Payer: COMMERCIAL

## 2023-02-06 ENCOUNTER — TELEPHONE (OUTPATIENT)
Dept: INTERNAL MEDICINE | Facility: CLINIC | Age: 28
End: 2023-02-06
Payer: COMMERCIAL

## 2023-02-06 LAB
T VAGINALIS RRNA SPEC QL NAA+PROBE: NOT DETECTED
TRICHOMONAS VAGINALIS RNA, QUAL, SOURCE: NORMAL

## 2023-02-06 NOTE — TELEPHONE ENCOUNTER
"Pt states that he recently has returned from visiting [place] and started having UTI symptoms. He went to Urgent Care a few days ago because he thought he could have a STD, but test was negative. He states that he still has discomfort when urinating and now his eyes look " blood shot and yellow" . Dr Gomez is out of the the office this week, So I suggested that he go to the ER not another Urgent Care to be checked. Pt agreed and said that he will go now to [facility]/ [place].  "

## 2023-02-13 ENCOUNTER — PATIENT OUTREACH (OUTPATIENT)
Dept: ADMINISTRATIVE | Facility: HOSPITAL | Age: 28
End: 2023-02-13
Payer: COMMERCIAL

## 2023-02-14 ENCOUNTER — OFFICE VISIT (OUTPATIENT)
Dept: INTERNAL MEDICINE | Facility: CLINIC | Age: 28
End: 2023-02-14
Payer: COMMERCIAL

## 2023-02-14 ENCOUNTER — LAB VISIT (OUTPATIENT)
Dept: LAB | Facility: HOSPITAL | Age: 28
End: 2023-02-14
Attending: NURSE PRACTITIONER
Payer: COMMERCIAL

## 2023-02-14 VITALS
BODY MASS INDEX: 26.57 KG/M2 | DIASTOLIC BLOOD PRESSURE: 82 MMHG | WEIGHT: 185.63 LBS | OXYGEN SATURATION: 98 % | HEIGHT: 70 IN | HEART RATE: 100 BPM | TEMPERATURE: 99 F | SYSTOLIC BLOOD PRESSURE: 128 MMHG

## 2023-02-14 DIAGNOSIS — R74.8 ELEVATED LIVER ENZYMES: ICD-10-CM

## 2023-02-14 DIAGNOSIS — Z09 HOSPITAL DISCHARGE FOLLOW-UP: Primary | ICD-10-CM

## 2023-02-14 DIAGNOSIS — R19.5 PALE STOOL: ICD-10-CM

## 2023-02-14 DIAGNOSIS — R82.998 DARK URINE: ICD-10-CM

## 2023-02-14 DIAGNOSIS — R63.4 WEIGHT LOSS: ICD-10-CM

## 2023-02-14 PROBLEM — Z86.19 HISTORY OF CHLAMYDIA: Status: RESOLVED | Noted: 2022-10-06 | Resolved: 2023-02-14

## 2023-02-14 LAB
ALBUMIN SERPL BCP-MCNC: 3.4 G/DL (ref 3.5–5.2)
ALBUMIN SERPL BCP-MCNC: 3.4 G/DL (ref 3.5–5.2)
ALP SERPL-CCNC: 119 U/L (ref 55–135)
ALP SERPL-CCNC: 119 U/L (ref 55–135)
ALT SERPL W/O P-5'-P-CCNC: 78 U/L (ref 10–44)
ALT SERPL W/O P-5'-P-CCNC: 78 U/L (ref 10–44)
ANION GAP SERPL CALC-SCNC: 10 MMOL/L (ref 8–16)
AST SERPL-CCNC: 51 U/L (ref 10–40)
AST SERPL-CCNC: 51 U/L (ref 10–40)
BILIRUB DIRECT SERPL-MCNC: 5 MG/DL (ref 0.1–0.3)
BILIRUB SERPL-MCNC: 6.9 MG/DL (ref 0.1–1)
BILIRUB SERPL-MCNC: 6.9 MG/DL (ref 0.1–1)
BILIRUB SERPL-MCNC: NORMAL MG/DL
BLOOD URINE, POC: NORMAL
BUN SERPL-MCNC: 15 MG/DL (ref 6–20)
CALCIUM SERPL-MCNC: 9.3 MG/DL (ref 8.7–10.5)
CHLORIDE SERPL-SCNC: 101 MMOL/L (ref 95–110)
CK SERPL-CCNC: 343 U/L (ref 20–200)
CLARITY, POC UA: CLEAR
CO2 SERPL-SCNC: 25 MMOL/L (ref 23–29)
COLOR, POC UA: NORMAL
CREAT SERPL-MCNC: 1.3 MG/DL (ref 0.5–1.4)
EST. GFR  (NO RACE VARIABLE): >60 ML/MIN/1.73 M^2
GLUCOSE SERPL-MCNC: 94 MG/DL (ref 70–110)
GLUCOSE UR QL STRIP: NORMAL
KETONES UR QL STRIP: NORMAL
LEUKOCYTE ESTERASE URINE, POC: NORMAL
NITRITE, POC UA: NORMAL
PH, POC UA: 5
POTASSIUM SERPL-SCNC: 4 MMOL/L (ref 3.5–5.1)
PROT SERPL-MCNC: 7.5 G/DL (ref 6–8.4)
PROT SERPL-MCNC: 7.5 G/DL (ref 6–8.4)
PROTEIN, POC: NORMAL
SODIUM SERPL-SCNC: 136 MMOL/L (ref 136–145)
SPECIFIC GRAVITY, POC UA: 1.02
UROBILINOGEN, POC UA: NORMAL

## 2023-02-14 PROCEDURE — 99214 PR OFFICE/OUTPT VISIT, EST, LEVL IV, 30-39 MIN: ICD-10-PCS | Mod: S$GLB,,, | Performed by: NURSE PRACTITIONER

## 2023-02-14 PROCEDURE — 3079F DIAST BP 80-89 MM HG: CPT | Mod: CPTII,S$GLB,, | Performed by: NURSE PRACTITIONER

## 2023-02-14 PROCEDURE — 82550 ASSAY OF CK (CPK): CPT | Performed by: NURSE PRACTITIONER

## 2023-02-14 PROCEDURE — 3008F BODY MASS INDEX DOCD: CPT | Mod: CPTII,S$GLB,, | Performed by: NURSE PRACTITIONER

## 2023-02-14 PROCEDURE — 81002 POCT URINE DIPSTICK WITHOUT MICROSCOPE: ICD-10-PCS | Mod: S$GLB,,, | Performed by: NURSE PRACTITIONER

## 2023-02-14 PROCEDURE — 1159F PR MEDICATION LIST DOCUMENTED IN MEDICAL RECORD: ICD-10-PCS | Mod: CPTII,S$GLB,, | Performed by: NURSE PRACTITIONER

## 2023-02-14 PROCEDURE — 1159F MED LIST DOCD IN RCRD: CPT | Mod: CPTII,S$GLB,, | Performed by: NURSE PRACTITIONER

## 2023-02-14 PROCEDURE — 99214 OFFICE O/P EST MOD 30 MIN: CPT | Mod: S$GLB,,, | Performed by: NURSE PRACTITIONER

## 2023-02-14 PROCEDURE — 80053 COMPREHEN METABOLIC PANEL: CPT | Performed by: NURSE PRACTITIONER

## 2023-02-14 PROCEDURE — 3074F PR MOST RECENT SYSTOLIC BLOOD PRESSURE < 130 MM HG: ICD-10-PCS | Mod: CPTII,S$GLB,, | Performed by: NURSE PRACTITIONER

## 2023-02-14 PROCEDURE — 1160F PR REVIEW ALL MEDS BY PRESCRIBER/CLIN PHARMACIST DOCUMENTED: ICD-10-PCS | Mod: CPTII,S$GLB,, | Performed by: NURSE PRACTITIONER

## 2023-02-14 PROCEDURE — 36415 COLL VENOUS BLD VENIPUNCTURE: CPT | Mod: PO | Performed by: NURSE PRACTITIONER

## 2023-02-14 PROCEDURE — 3008F PR BODY MASS INDEX (BMI) DOCUMENTED: ICD-10-PCS | Mod: CPTII,S$GLB,, | Performed by: NURSE PRACTITIONER

## 2023-02-14 PROCEDURE — 99999 PR PBB SHADOW E&M-EST. PATIENT-LVL IV: ICD-10-PCS | Mod: PBBFAC,,, | Performed by: NURSE PRACTITIONER

## 2023-02-14 PROCEDURE — 81002 URINALYSIS NONAUTO W/O SCOPE: CPT | Mod: S$GLB,,, | Performed by: NURSE PRACTITIONER

## 2023-02-14 PROCEDURE — 3079F PR MOST RECENT DIASTOLIC BLOOD PRESSURE 80-89 MM HG: ICD-10-PCS | Mod: CPTII,S$GLB,, | Performed by: NURSE PRACTITIONER

## 2023-02-14 PROCEDURE — 1160F RVW MEDS BY RX/DR IN RCRD: CPT | Mod: CPTII,S$GLB,, | Performed by: NURSE PRACTITIONER

## 2023-02-14 PROCEDURE — 3074F SYST BP LT 130 MM HG: CPT | Mod: CPTII,S$GLB,, | Performed by: NURSE PRACTITIONER

## 2023-02-14 PROCEDURE — 99999 PR PBB SHADOW E&M-EST. PATIENT-LVL IV: CPT | Mod: PBBFAC,,, | Performed by: NURSE PRACTITIONER

## 2023-02-14 RX ORDER — MONTELUKAST SODIUM 4 MG/1
1 TABLET, CHEWABLE ORAL 2 TIMES DAILY
COMMUNITY
Start: 2023-02-10

## 2023-02-14 RX ORDER — PROCHLORPERAZINE MALEATE 10 MG
10 TABLET ORAL
COMMUNITY
Start: 2023-02-09

## 2023-02-14 NOTE — PROGRESS NOTES
"Subjective:       Patient ID: Bennett Rubin is a 27 y.o. male.    Chief Complaint: Follow-up, Anorexia, and Insomnia    Pt presents to clinic today for hospital follow up  New to me, PCP Dr. Gomez   Pt was admitted overnight at [facility] 2/6/2023-2/7/2023  Records are not available for review at this time  Pt reports his liver enzymes were elevated, never got a diagnosis   Seen GI at [facility] 2/8/2023 started on colestipol, compazine  Doesn't feel like the meds are helping  Requesting a second opinion from GI    Pt complains of :  Loss of energy  Nausea  Jaundice  White stool  Itching full body extreme during night time  Fine rash to body  Weight loss on 2/2- 200 lbs, Down to 185 lbs today   Reports he is having trouble eating due to nausea  Eyes yellow  Dark urine despite drinking 1 gallon of water/day  Admits to steroid use- stopped about 3 weeks ago, but has been on and off for many years         /82   Pulse 100   Temp 98.8 °F (37.1 °C)   Ht 5' 10" (1.778 m)   Wt 84.2 kg (185 lb 10 oz)   SpO2 98%   BMI 26.63 kg/m²     Review of Systems   Constitutional:  Positive for activity change, appetite change and unexpected weight change. Negative for chills, diaphoresis, fatigue and fever.   HENT: Negative.     Eyes: Negative.    Respiratory:  Negative for apnea, chest tightness, shortness of breath and stridor.    Cardiovascular:  Negative for chest pain, palpitations and leg swelling.   Gastrointestinal:  Positive for diarrhea, nausea and vomiting. Negative for abdominal distention.        White stools   Endocrine: Negative.    Genitourinary: Negative.    Musculoskeletal:  Negative for arthralgias and myalgias.   Skin:  Negative for color change, pallor, rash and wound.   Allergic/Immunologic: Negative.    Neurological:  Negative for dizziness, facial asymmetry, light-headedness and headaches.   Hematological:  Negative for adenopathy.   Psychiatric/Behavioral:  Negative for agitation and behavioral problems.    "   Objective:      Physical Exam  Vitals and nursing note reviewed.   Constitutional:       General: He is not in acute distress.     Appearance: He is well-developed. He is not diaphoretic.   HENT:      Head: Normocephalic and atraumatic.   Eyes:      General: Scleral icterus present.   Cardiovascular:      Rate and Rhythm: Normal rate and regular rhythm.      Heart sounds: Normal heart sounds.   Pulmonary:      Effort: Pulmonary effort is normal. No respiratory distress.      Breath sounds: Normal breath sounds.   Abdominal:      General: Bowel sounds are normal. There is no distension.      Tenderness: There is no abdominal tenderness.   Skin:     General: Skin is warm and dry.      Findings: No rash.   Neurological:      Mental Status: He is alert and oriented to person, place, and time.   Psychiatric:         Behavior: Behavior normal.         Thought Content: Thought content normal.         Judgment: Judgment normal.       Assessment:       1. Hospital discharge follow-up    2. Elevated liver enzymes    3. Dark urine    4. Pale stool    5. Weight loss    6. BMI 26.0-26.9,adult        Plan:       eBnnett was seen today for follow-up, anorexia and insomnia.    Diagnoses and all orders for this visit:    Hospital discharge follow-up  Elevated liver enzymes  -     Hepatic Function Panel; Future  -     Comprehensive Metabolic Panel; Future  Dark urine  -     POCT URINE DIPSTICK WITHOUT MICROSCOPE  -     CK; Future  Pale stool  -     Ambulatory referral/consult to Gastroenterology; Future  Weight loss  -     Ambulatory referral/consult to Gastroenterology; Future    BMI 26.0-26.9,adult      Requested [facility] records to review  Will repeat labs today  GI referral within [facility]  Strict ED precautions discussed with pt  Follow up with Dr. Gomez in 1 week  Follow up for worsening or no improvement in symptoms and PRN.

## 2023-02-14 NOTE — PROGRESS NOTES
GI Clinic Note    Referring provider: Guerrero Harris    Chief complaint:   Chief Complaint   Patient presents with    Elevated Hepatic Enzymes       Clinical Summary: Bennett Rubin is a 27 y.o. male who presents to clinic for elevated liver enzymes       Patient was admitted [facility] 2/6/2023-2/7/2023 for evaluation of elevated liver enzymes Patient is here for second opinion. His labs today shows bilirubin 7 and elevated liver enzymes.     Patient mentions that he had visit  to [place] a month ago during which he started having stomach upset and vomiting later he noticed jaundice.  He was admitted for evaluation at  [facility].  He mentions all the workup including viral hepatitis panel and ultrasound were negative and since his liver tests improved he was discharged after hydration. Per patient no diagnosis reached. No records available for review.  Today  Patient has significant nausea. Also complains itching and severo Coloured stools. Denies  vomiting, hematemesis, melena, or hematochezia.  He denies any signs of liver cell failure.  No encephalopathy or bleeding tendencies.  Patient has been taking anabolic steroids and testosterone last 5-6 months he has been off those  over last 3-4 weeks. No alternative or herbal medications. Taking Benadryl and colestipol for itching as prescribed during last hospitalization.     Abdominal pain - no  Reflux - no  Dysphagia - no   Bowel habits - normal  GI bleeding - none  NSAID usage - none    Denies alcohol      History reviewed. No pertinent past medical history.  Past Surgical History:   Procedure Laterality Date    CYST REMOVAL Left 2005    cyst removal under left nipple    RECONSTRUCTION OF FINGER Left     pinky finger     Family History   Problem Relation Age of Onset    Hypertension Father     Dementia Maternal Grandmother     Heart attack Paternal Grandmother     Heart attack Paternal Grandfather      Social History     Socioeconomic History    Marital status: Single    Tobacco Use    Smoking status: Former     Types: Vaping with nicotine    Smokeless tobacco: Never   Substance and Sexual Activity    Alcohol use: Not Currently    Drug use: Yes     Types: Anabolic steroids    Sexual activity: Yes     Partners: Female     Birth control/protection: Condom       Review of Systems:  14 point ROS negative except for above pertinent positives.    Review of Systems   Constitutional:  Negative for fever and weight loss.   HENT:  Negative for hearing loss and tinnitus.    Eyes:  Negative for double vision and photophobia.   Respiratory:  Negative for hemoptysis and wheezing.    Cardiovascular:  Negative for chest pain and palpitations.   Gastrointestinal:  Negative for blood in stool and melena.   Musculoskeletal:  Negative for falls.   Skin:  Negative for itching and rash.   Neurological:  Negative for focal weakness.   Endo/Heme/Allergies:  Negative for polydipsia.   Psychiatric/Behavioral:  Negative for hallucinations and suicidal ideas.          Objective:    Physical Exam:  Vitals:    02/15/23 1055   BP: 118/70   Pulse: 103       Physical Exam  Constitutional:       Appearance: Normal appearance.   HENT:      Head: Normocephalic and atraumatic.      Nose: No congestion.      Mouth/Throat:      Mouth: Mucous membranes are moist.   Eyes:      Pupils: Pupils are equal, round, and reactive to light.   Cardiovascular:      Heart sounds: No murmur heard.    No friction rub.   Pulmonary:      Effort: No respiratory distress.      Breath sounds: No stridor. No rhonchi.   Abdominal:      General: There is no distension.      Palpations: There is no mass.      Tenderness: There is no abdominal tenderness.   Musculoskeletal:         General: No swelling or deformity.      Cervical back: No rigidity or tenderness.   Skin:     Coloration: Skin is jaundiced.      Findings: Bruising present.   Neurological:      Mental Status: He is alert.      Cranial Nerves: No cranial nerve deficit.      Motor:  No weakness.   Psychiatric:         Mood and Affect: Mood normal.         Behavior: Behavior normal.         Pertinent Labs:     Office Visit on 02/14/2023   Component Date Value Ref Range Status    Color, UA 02/14/2023 Angle   Final    pH, UA 02/14/2023 5   Final    WBC, UA 02/14/2023 neg   Final    Nitrite, UA 02/14/2023 neg   Final    Protein, POC 02/14/2023 neg   Final    Glucose, UA 02/14/2023 nor   Final    Ketones, UA 02/14/2023 neg   Final    Urobilinogen, UA 02/14/2023 nor   Final    Bilirubin, POC 02/14/2023 ++   Final    Blood, UA 02/14/2023 neg   Final    Clarity, UA 02/14/2023 Clear   Final    Spec Grav UA 02/14/2023 1.020   Final   Lab Visit on 02/14/2023   Component Date Value Ref Range Status    Total Protein 02/14/2023 7.5  6.0 - 8.4 g/dL Final    Albumin 02/14/2023 3.4 (L)  3.5 - 5.2 g/dL Final    Total Bilirubin 02/14/2023 6.9 (H)  0.1 - 1.0 mg/dL Final    Bilirubin, Direct 02/14/2023 5.0 (H)  0.1 - 0.3 mg/dL Final    AST 02/14/2023 51 (H)  10 - 40 U/L Final    ALT 02/14/2023 78 (H)  10 - 44 U/L Final    Alkaline Phosphatase 02/14/2023 119  55 - 135 U/L Final    Sodium 02/14/2023 136  136 - 145 mmol/L Final    Potassium 02/14/2023 4.0  3.5 - 5.1 mmol/L Final    Chloride 02/14/2023 101  95 - 110 mmol/L Final    CO2 02/14/2023 25  23 - 29 mmol/L Final    Glucose 02/14/2023 94  70 - 110 mg/dL Final    BUN 02/14/2023 15  6 - 20 mg/dL Final    Creatinine 02/14/2023 1.3  0.5 - 1.4 mg/dL Final    Calcium 02/14/2023 9.3  8.7 - 10.5 mg/dL Final    Total Protein 02/14/2023 7.5  6.0 - 8.4 g/dL Final    Albumin 02/14/2023 3.4 (L)  3.5 - 5.2 g/dL Final    Total Bilirubin 02/14/2023 6.9 (H)  0.1 - 1.0 mg/dL Final    Alkaline Phosphatase 02/14/2023 119  55 - 135 U/L Final    AST 02/14/2023 51 (H)  10 - 40 U/L Final    ALT 02/14/2023 78 (H)  10 - 44 U/L Final    Anion Gap 02/14/2023 10  8 - 16 mmol/L Final    eGFR 02/14/2023 >60.0  >60 mL/min/1.73 m^2 Final    CPK 02/14/2023 343 (H)  20 - 200 U/L Final   Office  Visit on 02/02/2023   Component Date Value Ref Range Status    Chlamydia, Amplified DNA 02/02/2023 Not Detected  Not Detected Final    N gonorrhoeae, amplified DNA 02/02/2023 Not Detected  Not Detected Final    Trichomonas vaginalis RNA, Qual, s* 02/02/2023 See Below   Final    Trichomonas vaginalis, QL, TMA 02/02/2023 Not detected   Final   Office Visit on 12/23/2022   Component Date Value Ref Range Status    Chlamydia, Amplified DNA 12/23/2022 Detected (A)  Not Detected Final    N gonorrhoeae, amplified DNA 12/23/2022 Not Detected  Not Detected Final    Trichomonas vaginalis RNA, Qual, s* 12/23/2022 See Below   Final    Trichomonas vaginalis, QL, TMA 12/23/2022 Not detected   Final        Imaging:  No images are attached to the encounter.    Assessment:  Problem List Items Addressed This Visit          Endocrine    Weight loss    Relevant Medications    hydrOXYzine HCL (ATARAX) 25 MG tablet    Other Relevant Orders    CBC Auto Differential    Comprehensive Metabolic Panel    Protime-INR    Phosphatidylethanol (PETH)    AFP Tumor Marker       GI    Pale stool    Subacute liver failure without hepatic coma - Primary    Relevant Orders    MRI Abdomen W WO Contrast_INC MRCP (XPD)    CBC Auto Differential    Comprehensive Metabolic Panel    Protime-INR    Phosphatidylethanol (PETH)    AFP Tumor Marker    RAQUEL Screen w/Reflex    Alpha-1-Antitrypsin    Antimitochondrial Antibody    Anti-Smooth Muscle Antibody    Ceruloplasmin    RAQUEL Screen w/Reflex    Alpha-1-Antitrypsin    Acetaminophen Level    Anti-Liver, Kidney, Microsome Ab    Antimitochondrial Antibody    Anti-Smooth Muscle Antibody    Celiac Disease Panel    Ceruloplasmin    CMV DNA, Quantitative, PCR    Hepatitis E IgM Antibody    Hepatitis Delta Virus    Hepatitis B Core Antibody, IgM    IgG    HSV 1 & 2, IgM    Hepatitis Panel, Acute    TSH    Phosphatidylethanol (PETH)    Elevated liver enzymes    Relevant Medications    ursodioL (ACTIGALL) 300 mg capsule        Bennett was seen today for elevated hepatic enzymes.    Diagnoses and all orders for this visit:    Subacute liver failure without hepatic coma  -     MRI Abdomen W WO Contrast_INC MRCP (XPD); Future  -     CBC Auto Differential; Standing  -     Comprehensive Metabolic Panel; Standing  -     Protime-INR; Standing  -     Phosphatidylethanol (PETH); Standing  -     AFP Tumor Marker; Standing  -     RAQUEL Screen w/Reflex; Future  -     Alpha-1-Antitrypsin; Future  -     Antimitochondrial Antibody; Future  -     Anti-Smooth Muscle Antibody; Future  -     Ceruloplasmin; Future  -     RAQUEL Screen w/Reflex; Future  -     Alpha-1-Antitrypsin; Future  -     Acetaminophen Level; Future  -     Anti-Liver, Kidney, Microsome Ab; Future  -     Antimitochondrial Antibody; Future  -     Anti-Smooth Muscle Antibody; Future  -     Celiac Disease Panel; Future  -     Ceruloplasmin; Future  -     CMV DNA, Quantitative, PCR; Future  -     Hepatitis E IgM Antibody; Future  -     Hepatitis Delta Virus; Future  -     Hepatitis B Core Antibody, IgM; Future  -     IgG; Future  -     HSV 1 & 2, IgM; Future  -     Hepatitis Panel, Acute; Future  -     TSH; Future  -     Phosphatidylethanol (PETH); Future    Pale stool  -     Ambulatory referral/consult to Gastroenterology    Weight loss  -     Ambulatory referral/consult to Gastroenterology  -     hydrOXYzine HCL (ATARAX) 25 MG tablet; Take 1 tablet (25 mg total) by mouth 3 (three) times daily as needed for Itching.  -     CBC Auto Differential; Standing  -     Comprehensive Metabolic Panel; Standing  -     Protime-INR; Standing  -     Phosphatidylethanol (PETH); Standing  -     AFP Tumor Marker; Standing    Elevated liver enzymes  -     ursodioL (ACTIGALL) 300 mg capsule; Take 1 capsule (300 mg total) by mouth 2 (two) times daily.          Plan:  - Complete etiological work up for elevated liver enzymes  -MRI/MRCP now  -Avoid all Hepatotoxic medication  -Close monitoring for signs of Acute  liver failure  -Discussed further evaluation including liver biopsy if necessary      Follow up in about 1 month (around 3/15/2023).      Time Statement  A total Time Includes preparing to see patient, reviewing  diagnostic studies and records, direct face-to-face visit, completing orders, medications , reconciliation, prescription management, and care coordination    We discussed in depth the nature of the patient's disease, the management plan in details. I have provided the patient with an opportunity to ask questions and have all questions answered to his satisfaction.       JACINTO FORD MD  Gastroenterology & Transplant Hepatology  [facility], [place] [email or web address]

## 2023-02-15 ENCOUNTER — LAB VISIT (OUTPATIENT)
Dept: LAB | Facility: HOSPITAL | Age: 28
End: 2023-02-15
Attending: INTERNAL MEDICINE
Payer: COMMERCIAL

## 2023-02-15 ENCOUNTER — OFFICE VISIT (OUTPATIENT)
Dept: GASTROENTEROLOGY | Facility: CLINIC | Age: 28
End: 2023-02-15
Payer: COMMERCIAL

## 2023-02-15 VITALS
DIASTOLIC BLOOD PRESSURE: 70 MMHG | BODY MASS INDEX: 27.21 KG/M2 | HEIGHT: 70 IN | HEART RATE: 103 BPM | SYSTOLIC BLOOD PRESSURE: 118 MMHG | WEIGHT: 190.06 LBS

## 2023-02-15 DIAGNOSIS — R74.8 ELEVATED LIVER ENZYMES: ICD-10-CM

## 2023-02-15 DIAGNOSIS — K72.00 SUBACUTE LIVER FAILURE WITHOUT HEPATIC COMA: Primary | ICD-10-CM

## 2023-02-15 DIAGNOSIS — R63.4 WEIGHT LOSS: ICD-10-CM

## 2023-02-15 DIAGNOSIS — R19.5 PALE STOOL: ICD-10-CM

## 2023-02-15 DIAGNOSIS — K72.00 SUBACUTE LIVER FAILURE WITHOUT HEPATIC COMA: ICD-10-CM

## 2023-02-15 LAB
ALBUMIN SERPL BCP-MCNC: 3.4 G/DL (ref 3.5–5.2)
ALP SERPL-CCNC: 119 U/L (ref 55–135)
ALT SERPL W/O P-5'-P-CCNC: 71 U/L (ref 10–44)
ANION GAP SERPL CALC-SCNC: 11 MMOL/L (ref 8–16)
APAP SERPL-MCNC: <3 UG/ML (ref 10–20)
AST SERPL-CCNC: 48 U/L (ref 10–40)
BASOPHILS # BLD AUTO: 0.06 K/UL (ref 0–0.2)
BASOPHILS NFR BLD: 0.7 % (ref 0–1.9)
BILIRUB SERPL-MCNC: 6.7 MG/DL (ref 0.1–1)
BUN SERPL-MCNC: 17 MG/DL (ref 6–20)
CALCIUM SERPL-MCNC: 9.4 MG/DL (ref 8.7–10.5)
CHLORIDE SERPL-SCNC: 100 MMOL/L (ref 95–110)
CO2 SERPL-SCNC: 23 MMOL/L (ref 23–29)
CREAT SERPL-MCNC: 1.2 MG/DL (ref 0.5–1.4)
DIFFERENTIAL METHOD: ABNORMAL
EOSINOPHIL # BLD AUTO: 0.1 K/UL (ref 0–0.5)
EOSINOPHIL NFR BLD: 1.1 % (ref 0–8)
ERYTHROCYTE [DISTWIDTH] IN BLOOD BY AUTOMATED COUNT: 15.8 % (ref 11.5–14.5)
EST. GFR  (NO RACE VARIABLE): >60 ML/MIN/1.73 M^2
GLUCOSE SERPL-MCNC: 94 MG/DL (ref 70–110)
HCT VFR BLD AUTO: 47.4 % (ref 40–54)
HGB BLD-MCNC: 15.6 G/DL (ref 14–18)
IGG SERPL-MCNC: 1209 MG/DL (ref 650–1600)
IMM GRANULOCYTES # BLD AUTO: 0.11 K/UL (ref 0–0.04)
IMM GRANULOCYTES NFR BLD AUTO: 1.2 % (ref 0–0.5)
INR PPP: 0.9 (ref 0.8–1.2)
LYMPHOCYTES # BLD AUTO: 1.8 K/UL (ref 1–4.8)
LYMPHOCYTES NFR BLD: 19.9 % (ref 18–48)
MCH RBC QN AUTO: 30.2 PG (ref 27–31)
MCHC RBC AUTO-ENTMCNC: 32.9 G/DL (ref 32–36)
MCV RBC AUTO: 92 FL (ref 82–98)
MONOCYTES # BLD AUTO: 0.8 K/UL (ref 0.3–1)
MONOCYTES NFR BLD: 9.1 % (ref 4–15)
NEUTROPHILS # BLD AUTO: 6.1 K/UL (ref 1.8–7.7)
NEUTROPHILS NFR BLD: 68 % (ref 38–73)
NRBC BLD-RTO: 0 /100 WBC
PLATELET # BLD AUTO: 345 K/UL (ref 150–450)
PMV BLD AUTO: 12.2 FL (ref 9.2–12.9)
POTASSIUM SERPL-SCNC: 3.6 MMOL/L (ref 3.5–5.1)
PROT SERPL-MCNC: 7.3 G/DL (ref 6–8.4)
PROTHROMBIN TIME: 9.8 SEC (ref 9–12.5)
RBC # BLD AUTO: 5.16 M/UL (ref 4.6–6.2)
SODIUM SERPL-SCNC: 134 MMOL/L (ref 136–145)
TSH SERPL DL<=0.005 MIU/L-ACNC: 1.11 UIU/ML (ref 0.4–4)
WBC # BLD AUTO: 8.91 K/UL (ref 3.9–12.7)

## 2023-02-15 PROCEDURE — 86692 HEPATITIS DELTA AGENT ANTBDY: CPT | Performed by: INTERNAL MEDICINE

## 2023-02-15 PROCEDURE — 1159F MED LIST DOCD IN RCRD: CPT | Mod: CPTII,S$GLB,, | Performed by: INTERNAL MEDICINE

## 2023-02-15 PROCEDURE — 86015 ACTIN ANTIBODY EACH: CPT | Performed by: INTERNAL MEDICINE

## 2023-02-15 PROCEDURE — 99999 PR PBB SHADOW E&M-EST. PATIENT-LVL IV: CPT | Mod: PBBFAC,,, | Performed by: INTERNAL MEDICINE

## 2023-02-15 PROCEDURE — 80074 ACUTE HEPATITIS PANEL: CPT | Performed by: INTERNAL MEDICINE

## 2023-02-15 PROCEDURE — 86381 MITOCHONDRIAL ANTIBODY EACH: CPT | Performed by: INTERNAL MEDICINE

## 2023-02-15 PROCEDURE — 80143 DRUG ASSAY ACETAMINOPHEN: CPT | Performed by: INTERNAL MEDICINE

## 2023-02-15 PROCEDURE — 80053 COMPREHEN METABOLIC PANEL: CPT | Performed by: INTERNAL MEDICINE

## 2023-02-15 PROCEDURE — 3074F PR MOST RECENT SYSTOLIC BLOOD PRESSURE < 130 MM HG: ICD-10-PCS | Mod: CPTII,S$GLB,, | Performed by: INTERNAL MEDICINE

## 2023-02-15 PROCEDURE — 1160F PR REVIEW ALL MEDS BY PRESCRIBER/CLIN PHARMACIST DOCUMENTED: ICD-10-PCS | Mod: CPTII,S$GLB,, | Performed by: INTERNAL MEDICINE

## 2023-02-15 PROCEDURE — 99204 OFFICE O/P NEW MOD 45 MIN: CPT | Mod: S$GLB,,, | Performed by: INTERNAL MEDICINE

## 2023-02-15 PROCEDURE — 80321 ALCOHOLS BIOMARKERS 1OR 2: CPT | Performed by: INTERNAL MEDICINE

## 2023-02-15 PROCEDURE — 3074F SYST BP LT 130 MM HG: CPT | Mod: CPTII,S$GLB,, | Performed by: INTERNAL MEDICINE

## 2023-02-15 PROCEDURE — 3008F BODY MASS INDEX DOCD: CPT | Mod: CPTII,S$GLB,, | Performed by: INTERNAL MEDICINE

## 2023-02-15 PROCEDURE — 83516 IMMUNOASSAY NONANTIBODY: CPT | Performed by: INTERNAL MEDICINE

## 2023-02-15 PROCEDURE — 3078F PR MOST RECENT DIASTOLIC BLOOD PRESSURE < 80 MM HG: ICD-10-PCS | Mod: CPTII,S$GLB,, | Performed by: INTERNAL MEDICINE

## 2023-02-15 PROCEDURE — 3078F DIAST BP <80 MM HG: CPT | Mod: CPTII,S$GLB,, | Performed by: INTERNAL MEDICINE

## 2023-02-15 PROCEDURE — 85025 COMPLETE CBC W/AUTO DIFF WBC: CPT | Performed by: INTERNAL MEDICINE

## 2023-02-15 PROCEDURE — 86364 TISS TRNSGLTMNASE EA IG CLAS: CPT | Performed by: INTERNAL MEDICINE

## 2023-02-15 PROCEDURE — 82390 ASSAY OF CERULOPLASMIN: CPT | Performed by: INTERNAL MEDICINE

## 2023-02-15 PROCEDURE — 86376 MICROSOMAL ANTIBODY EACH: CPT | Performed by: INTERNAL MEDICINE

## 2023-02-15 PROCEDURE — 1159F PR MEDICATION LIST DOCUMENTED IN MEDICAL RECORD: ICD-10-PCS | Mod: CPTII,S$GLB,, | Performed by: INTERNAL MEDICINE

## 2023-02-15 PROCEDURE — 3008F PR BODY MASS INDEX (BMI) DOCUMENTED: ICD-10-PCS | Mod: CPTII,S$GLB,, | Performed by: INTERNAL MEDICINE

## 2023-02-15 PROCEDURE — 82784 ASSAY IGA/IGD/IGG/IGM EACH: CPT | Mod: 59 | Performed by: INTERNAL MEDICINE

## 2023-02-15 PROCEDURE — 85610 PROTHROMBIN TIME: CPT | Performed by: INTERNAL MEDICINE

## 2023-02-15 PROCEDURE — 36415 COLL VENOUS BLD VENIPUNCTURE: CPT | Mod: PN | Performed by: INTERNAL MEDICINE

## 2023-02-15 PROCEDURE — 82103 ALPHA-1-ANTITRYPSIN TOTAL: CPT | Performed by: INTERNAL MEDICINE

## 2023-02-15 PROCEDURE — 99204 PR OFFICE/OUTPT VISIT, NEW, LEVL IV, 45-59 MIN: ICD-10-PCS | Mod: S$GLB,,, | Performed by: INTERNAL MEDICINE

## 2023-02-15 PROCEDURE — 86038 ANTINUCLEAR ANTIBODIES: CPT | Performed by: INTERNAL MEDICINE

## 2023-02-15 PROCEDURE — 84443 ASSAY THYROID STIM HORMONE: CPT | Performed by: INTERNAL MEDICINE

## 2023-02-15 PROCEDURE — 86694 HERPES SIMPLEX NES ANTBDY: CPT | Performed by: INTERNAL MEDICINE

## 2023-02-15 PROCEDURE — 82105 ALPHA-FETOPROTEIN SERUM: CPT | Performed by: INTERNAL MEDICINE

## 2023-02-15 PROCEDURE — 1160F RVW MEDS BY RX/DR IN RCRD: CPT | Mod: CPTII,S$GLB,, | Performed by: INTERNAL MEDICINE

## 2023-02-15 PROCEDURE — 86790 VIRUS ANTIBODY NOS: CPT | Performed by: INTERNAL MEDICINE

## 2023-02-15 PROCEDURE — 99999 PR PBB SHADOW E&M-EST. PATIENT-LVL IV: ICD-10-PCS | Mod: PBBFAC,,, | Performed by: INTERNAL MEDICINE

## 2023-02-15 RX ORDER — HYDROXYZINE HYDROCHLORIDE 25 MG/1
25 TABLET, FILM COATED ORAL 3 TIMES DAILY PRN
Qty: 60 TABLET | Refills: 3 | Status: SHIPPED | OUTPATIENT
Start: 2023-02-15 | End: 2023-05-23

## 2023-02-15 RX ORDER — URSODIOL 300 MG/1
300 CAPSULE ORAL 2 TIMES DAILY
Qty: 180 CAPSULE | Refills: 3 | Status: SHIPPED | OUTPATIENT
Start: 2023-02-15 | End: 2024-02-15

## 2023-02-16 LAB
A1AT SERPL-MCNC: 190 MG/DL (ref 100–190)
A1AT SERPL-MCNC: 190 MG/DL (ref 100–190)
AFP SERPL-MCNC: <2 NG/ML (ref 0–8.4)
ANA SER QL IF: NORMAL
CERULOPLASMIN SERPL-MCNC: 48 MG/DL (ref 15–45)
CERULOPLASMIN SERPL-MCNC: 48 MG/DL (ref 15–45)
HAV IGM SERPL QL IA: NORMAL
HBV CORE IGM SERPL QL IA: NORMAL
HBV CORE IGM SERPL QL IA: NORMAL
HBV SURFACE AG SERPL QL IA: NORMAL
HCV AB SERPL QL IA: NORMAL

## 2023-02-17 LAB
CMV DNA SPEC QL NAA+PROBE: NOT DETECTED
CYTOMEGALOVIRUS LOG (IU/ML): NOT DETECTED LOGIU/ML
CYTOMEGALOVIRUS PCR, QUANT: NOT DETECTED IU/ML
MITOCHONDRIA AB TITR SER IF: NORMAL {TITER}
MITOCHONDRIA AB TITR SER IF: NORMAL {TITER}
SMOOTH MUSCLE AB TITR SER IF: NORMAL {TITER}
SMOOTH MUSCLE AB TITR SER IF: NORMAL {TITER}

## 2023-02-19 ENCOUNTER — PATIENT MESSAGE (OUTPATIENT)
Dept: GASTROENTEROLOGY | Facility: CLINIC | Age: 28
End: 2023-02-19
Payer: COMMERCIAL

## 2023-02-20 ENCOUNTER — HOSPITAL ENCOUNTER (OUTPATIENT)
Dept: RADIOLOGY | Facility: HOSPITAL | Age: 28
Discharge: HOME | End: 2023-02-20
Attending: INTERNAL MEDICINE
Payer: COMMERCIAL

## 2023-02-20 ENCOUNTER — HOSPITAL ENCOUNTER (EMERGENCY)
Facility: HOSPITAL | Age: 28
Discharge: HOME | End: 2023-02-20
Attending: EMERGENCY MEDICINE
Payer: COMMERCIAL

## 2023-02-20 ENCOUNTER — PATIENT MESSAGE (OUTPATIENT)
Dept: GASTROENTEROLOGY | Facility: CLINIC | Age: 28
End: 2023-02-20
Payer: COMMERCIAL

## 2023-02-20 VITALS
HEIGHT: 70 IN | BODY MASS INDEX: 26.39 KG/M2 | RESPIRATION RATE: 19 BRPM | DIASTOLIC BLOOD PRESSURE: 62 MMHG | HEART RATE: 92 BPM | OXYGEN SATURATION: 99 % | TEMPERATURE: 97 F | WEIGHT: 184.31 LBS | SYSTOLIC BLOOD PRESSURE: 129 MMHG

## 2023-02-20 DIAGNOSIS — K72.00 SUBACUTE LIVER FAILURE WITHOUT HEPATIC COMA: ICD-10-CM

## 2023-02-20 DIAGNOSIS — R19.7 DIARRHEA, UNSPECIFIED TYPE: ICD-10-CM

## 2023-02-20 DIAGNOSIS — R11.2 NAUSEA AND VOMITING, UNSPECIFIED VOMITING TYPE: Primary | ICD-10-CM

## 2023-02-20 LAB
ALBUMIN SERPL BCP-MCNC: 3.1 G/DL (ref 3.5–5.2)
ALP SERPL-CCNC: 134 U/L (ref 55–135)
ALT SERPL W/O P-5'-P-CCNC: 56 U/L (ref 10–44)
AMMONIA PLAS-SCNC: 47 UMOL/L (ref 10–50)
ANION GAP SERPL CALC-SCNC: 11 MMOL/L (ref 8–16)
AST SERPL-CCNC: 44 U/L (ref 10–40)
BASOPHILS # BLD AUTO: 0.06 K/UL (ref 0–0.2)
BASOPHILS NFR BLD: 0.6 % (ref 0–1.9)
BILIRUB SERPL-MCNC: 6 MG/DL (ref 0.1–1)
BILIRUB UR QL STRIP: ABNORMAL
BUN SERPL-MCNC: 13 MG/DL (ref 6–20)
CALCIUM SERPL-MCNC: 8.6 MG/DL (ref 8.7–10.5)
CHLORIDE SERPL-SCNC: 103 MMOL/L (ref 95–110)
CK SERPL-CCNC: 267 U/L (ref 20–200)
CLARITY UR: CLEAR
CO2 SERPL-SCNC: 21 MMOL/L (ref 23–29)
COLOR UR: YELLOW
CREAT SERPL-MCNC: 1.3 MG/DL (ref 0.5–1.4)
DIFFERENTIAL METHOD: ABNORMAL
EOSINOPHIL # BLD AUTO: 0.1 K/UL (ref 0–0.5)
EOSINOPHIL NFR BLD: 1.4 % (ref 0–8)
ERYTHROCYTE [DISTWIDTH] IN BLOOD BY AUTOMATED COUNT: 15.1 % (ref 11.5–14.5)
EST. GFR  (NO RACE VARIABLE): >60 ML/MIN/1.73 M^2
GLIADIN PEPTIDE IGA SER-ACNC: 1.1 U/ML
GLIADIN PEPTIDE IGG SER-ACNC: 1.8 U/ML
GLUCOSE SERPL-MCNC: 130 MG/DL (ref 70–110)
GLUCOSE UR QL STRIP: NEGATIVE
HCT VFR BLD AUTO: 40 % (ref 40–54)
HGB BLD-MCNC: 14.2 G/DL (ref 14–18)
HGB UR QL STRIP: NEGATIVE
HSV AB, IGM BY EIA: 0.12 INDEX
IGA SERPL-MCNC: 358 MG/DL (ref 70–400)
IMM GRANULOCYTES # BLD AUTO: 0.09 K/UL (ref 0–0.04)
IMM GRANULOCYTES NFR BLD AUTO: 0.9 % (ref 0–0.5)
INR PPP: 0.9 (ref 0.8–1.2)
KETONES UR QL STRIP: NEGATIVE
LEUKOCYTE ESTERASE UR QL STRIP: NEGATIVE
LIPASE SERPL-CCNC: 50 U/L (ref 4–60)
LKM AB SER-ACNC: 1.8 UNITS
LYMPHOCYTES # BLD AUTO: 1.7 K/UL (ref 1–4.8)
LYMPHOCYTES NFR BLD: 16.4 % (ref 18–48)
MCH RBC QN AUTO: 31.4 PG (ref 27–31)
MCHC RBC AUTO-ENTMCNC: 35.5 G/DL (ref 32–36)
MCV RBC AUTO: 89 FL (ref 82–98)
MONOCYTES # BLD AUTO: 1.1 K/UL (ref 0.3–1)
MONOCYTES NFR BLD: 10.9 % (ref 4–15)
NEUTROPHILS # BLD AUTO: 7.1 K/UL (ref 1.8–7.7)
NEUTROPHILS NFR BLD: 69.8 % (ref 38–73)
NITRITE UR QL STRIP: NEGATIVE
NRBC BLD-RTO: 0 /100 WBC
PH UR STRIP: 6 [PH] (ref 5–8)
PLATELET # BLD AUTO: 296 K/UL (ref 150–450)
PMV BLD AUTO: 10.7 FL (ref 9.2–12.9)
POTASSIUM SERPL-SCNC: 3.6 MMOL/L (ref 3.5–5.1)
PROT SERPL-MCNC: 7 G/DL (ref 6–8.4)
PROT UR QL STRIP: ABNORMAL
PROTHROMBIN TIME: 10 SEC (ref 9–12.5)
RBC # BLD AUTO: 4.52 M/UL (ref 4.6–6.2)
SODIUM SERPL-SCNC: 135 MMOL/L (ref 136–145)
SP GR UR STRIP: 1.03 (ref 1–1.03)
TTG IGA SER-ACNC: 1.4 U/ML
TTG IGG SER-ACNC: 0.9 U/ML
URN SPEC COLLECT METH UR: ABNORMAL
UROBILINOGEN UR STRIP-ACNC: ABNORMAL EU/DL
WBC # BLD AUTO: 10.09 K/UL (ref 3.9–12.7)

## 2023-02-20 PROCEDURE — 63600175 PHARM REV CODE 636 W HCPCS: Performed by: EMERGENCY MEDICINE

## 2023-02-20 PROCEDURE — A9585 GADOBUTROL INJECTION: HCPCS | Mod: PN | Performed by: INTERNAL MEDICINE

## 2023-02-20 PROCEDURE — 99285 EMERGENCY DEPT VISIT HI MDM: CPT | Mod: 25

## 2023-02-20 PROCEDURE — 96361 HYDRATE IV INFUSION ADD-ON: CPT

## 2023-02-20 PROCEDURE — 96374 THER/PROPH/DIAG INJ IV PUSH: CPT

## 2023-02-20 PROCEDURE — 76376 3D RENDER W/INTRP POSTPROCES: CPT | Mod: 26,,, | Performed by: RADIOLOGY

## 2023-02-20 PROCEDURE — 85610 PROTHROMBIN TIME: CPT | Performed by: EMERGENCY MEDICINE

## 2023-02-20 PROCEDURE — 74183 MRI ABDOMEN WITH AND WO_INC MRCP (XPD): ICD-10-PCS | Mod: 26,,, | Performed by: RADIOLOGY

## 2023-02-20 PROCEDURE — 83690 ASSAY OF LIPASE: CPT | Performed by: NURSE PRACTITIONER

## 2023-02-20 PROCEDURE — 82140 ASSAY OF AMMONIA: CPT | Performed by: NURSE PRACTITIONER

## 2023-02-20 PROCEDURE — 82550 ASSAY OF CK (CPK): CPT | Performed by: EMERGENCY MEDICINE

## 2023-02-20 PROCEDURE — 81003 URINALYSIS AUTO W/O SCOPE: CPT | Performed by: NURSE PRACTITIONER

## 2023-02-20 PROCEDURE — 25000003 PHARM REV CODE 250: Performed by: EMERGENCY MEDICINE

## 2023-02-20 PROCEDURE — 74183 MRI ABD W/O CNTR FLWD CNTR: CPT | Mod: TC,PN

## 2023-02-20 PROCEDURE — 25500020 PHARM REV CODE 255: Mod: PN | Performed by: INTERNAL MEDICINE

## 2023-02-20 PROCEDURE — 85025 COMPLETE CBC W/AUTO DIFF WBC: CPT | Performed by: NURSE PRACTITIONER

## 2023-02-20 PROCEDURE — 76376 MRI ABDOMEN WITH AND WO_INC MRCP (XPD): ICD-10-PCS | Mod: 26,,, | Performed by: RADIOLOGY

## 2023-02-20 PROCEDURE — 76376 3D RENDER W/INTRP POSTPROCES: CPT | Mod: TC,PN

## 2023-02-20 PROCEDURE — 80053 COMPREHEN METABOLIC PANEL: CPT | Performed by: NURSE PRACTITIONER

## 2023-02-20 PROCEDURE — 74183 MRI ABD W/O CNTR FLWD CNTR: CPT | Mod: 26,,, | Performed by: RADIOLOGY

## 2023-02-20 RX ORDER — GADOBUTROL 604.72 MG/ML
9 INJECTION INTRAVENOUS
Status: COMPLETED | OUTPATIENT
Start: 2023-02-20 | End: 2023-02-20

## 2023-02-20 RX ORDER — ONDANSETRON 2 MG/ML
4 INJECTION INTRAMUSCULAR; INTRAVENOUS
Status: COMPLETED | OUTPATIENT
Start: 2023-02-20 | End: 2023-02-20

## 2023-02-20 RX ORDER — DIPHENOXYLATE HYDROCHLORIDE AND ATROPINE SULFATE 2.5; .025 MG/1; MG/1
1 TABLET ORAL 4 TIMES DAILY PRN
Qty: 20 TABLET | Refills: 0 | Status: SHIPPED | OUTPATIENT
Start: 2023-02-20 | End: 2023-03-02

## 2023-02-20 RX ORDER — PROMETHAZINE HYDROCHLORIDE 25 MG/1
25 TABLET ORAL EVERY 6 HOURS PRN
Qty: 15 TABLET | Refills: 0 | Status: SHIPPED | OUTPATIENT
Start: 2023-02-20 | End: 2023-05-23

## 2023-02-20 RX ADMIN — SODIUM CHLORIDE 1000 ML: 9 INJECTION, SOLUTION INTRAVENOUS at 04:02

## 2023-02-20 RX ADMIN — GADOBUTROL 9 ML: 604.72 INJECTION INTRAVENOUS at 11:02

## 2023-02-20 RX ADMIN — ONDANSETRON 4 MG: 2 INJECTION INTRAMUSCULAR; INTRAVENOUS at 03:02

## 2023-02-20 RX ADMIN — SODIUM CHLORIDE 1000 ML: 9 INJECTION, SOLUTION INTRAVENOUS at 03:02

## 2023-02-20 NOTE — ED PROVIDER NOTES
SCRIBE #1 NOTE: I, Ioanakenia Bee, am scribing for, and in the presence of, Suhail Ray MD. I have scribed the entire note.       History     Chief Complaint   Patient presents with    Vomiting     Vomiting, jaundice, dark brown colored urine, had MRI for Liver today, no energy, was seen in [facility] ER yesterday for dark urine and Jaundice. Vomiting started this morning     Review of patient's allergies indicates:  No Known Allergies      History of Present Illness     HPI    2/20/2023, 4:09 PM  History obtained from the patient      History of Present Illness: Bennett Rubin is a 27 y.o. male patient  who presents to the Emergency Department for evaluation of discolored urine which onset gradually 2 weeks PTA. Pt returned from [place] 2 weeks ago and has since had symptoms of jaundice and brown urine that has progressively darkened. Pt notes that he went to ED in [place] yesterday but was discharged. Symptoms are constant and moderate in severity. No mitigating or exacerbating factors reported. Associated sxs include vomiting. Patient denies any fever, congestion, headaches, cough, and all other sxs at this time. No Prior Tx reported. No further complaints or concerns at this time.       Arrival mode: Personal vehicle   PCP: Pio Gomez MD        Past Medical History:  No past medical history on file.    Past Surgical History:  Past Surgical History:   Procedure Laterality Date    CYST REMOVAL Left 2005    cyst removal under left nipple    RECONSTRUCTION OF FINGER Left     pinky finger         Family History:  Family History   Problem Relation Age of Onset    Hypertension Father     Dementia Maternal Grandmother     Heart attack Paternal Grandmother     Heart attack Paternal Grandfather        Social History:  Social History     Tobacco Use    Smoking status: Former     Types: Vaping with nicotine    Smokeless tobacco: Never   Substance and Sexual Activity    Alcohol use: Not Currently    Drug use: Yes      Types: Anabolic steroids    Sexual activity: Yes     Partners: Female     Birth control/protection: Condom        Review of Systems     Review of Systems   Constitutional:  Negative for fever.   HENT:  Negative for congestion and sore throat.    Eyes:         (+) jaundiced   Respiratory:  Negative for cough and shortness of breath.    Cardiovascular:  Negative for chest pain.   Gastrointestinal:  Positive for vomiting. Negative for nausea.   Genitourinary:  Negative for dysuria.        (+) discolored urine   Musculoskeletal:  Negative for back pain.   Skin:  Positive for color change (jaundiced). Negative for rash.   Neurological:  Negative for weakness and headaches.   Hematological:  Does not bruise/bleed easily.   All other systems reviewed and are negative.     Physical Exam     Initial Vitals [02/20/23 1359]   BP Pulse Resp Temp SpO2   (!) 142/77 96 18 97.4 °F (36.3 °C) 98 %      MAP       --          Physical Exam  Nursing Notes and Vital Signs Reviewed.  Constitutional: Patient is in no acute distress. Well-developed and well-nourished.  Head: Atraumatic. Normocephalic.  Eyes: PERRL. EOM intact. Conjunctivae are not pale. No scleral icterus.  ENT: Mucous membranes are moist. Oropharynx is clear and symmetric.    Neck: Supple. Full ROM. No lymphadenopathy.  Cardiovascular: Regular rate. Regular rhythm. No murmurs, rubs, or gallops. Distal pulses are 2+ and symmetric.  Pulmonary/Chest: No respiratory distress. Clear to auscultation bilaterally. No wheezing or rales.  Abdominal: Soft and non-distended.  There is no tenderness.  No rebound, guarding, or rigidity. Good bowel sounds.  Genitourinary: No CVA tenderness  Musculoskeletal: Moves all extremities. No obvious deformities. No edema. No calf tenderness.  Skin: Warm and dry.  Neurological:  Alert, awake, and appropriate.  Normal speech.  No acute focal neurological deficits are appreciated.  Psychiatric: Normal affect. Good eye contact. Appropriate in  "content.     ED Course   Procedures  ED Vital Signs:  Vitals:    02/20/23 1359 02/20/23 1600 02/20/23 1700 02/20/23 1730   BP: (!) 142/77 (!) 142/69 127/72 129/62   Pulse: 96 87 86 92   Resp: 18  15 19   Temp: 97.4 °F (36.3 °C)   97.4 °F (36.3 °C)   TempSrc: Oral   Oral   SpO2: 98% 99% 100% 99%   Weight: 83.6 kg (184 lb 4.9 oz)      Height: 5' 10" (1.778 m)          Abnormal Lab Results:  Labs Reviewed   CBC W/ AUTO DIFFERENTIAL - Abnormal; Notable for the following components:       Result Value    RBC 4.52 (*)     MCH 31.4 (*)     RDW 15.1 (*)     Immature Granulocytes 0.9 (*)     Immature Grans (Abs) 0.09 (*)     Mono # 1.1 (*)     Lymph % 16.4 (*)     All other components within normal limits   COMPREHENSIVE METABOLIC PANEL - Abnormal; Notable for the following components:    Sodium 135 (*)     CO2 21 (*)     Glucose 130 (*)     Calcium 8.6 (*)     Albumin 3.1 (*)     Total Bilirubin 6.0 (*)     AST 44 (*)     ALT 56 (*)     All other components within normal limits   URINALYSIS, REFLEX TO URINE CULTURE - Abnormal; Notable for the following components:    Protein, UA Trace (*)     Bilirubin (UA) 2+ (*)     Urobilinogen, UA 2.0-3.0 (*)     All other components within normal limits    Narrative:     Specimen Source->Urine   CK - Abnormal; Notable for the following components:     (*)     All other components within normal limits   LIPASE   AMMONIA   PROTIME-INR        All Lab Results:  Results for orders placed or performed during the hospital encounter of 02/20/23   CBC auto differential   Result Value Ref Range    WBC 10.09 3.90 - 12.70 K/uL    RBC 4.52 (L) 4.60 - 6.20 M/uL    Hemoglobin 14.2 14.0 - 18.0 g/dL    Hematocrit 40.0 40.0 - 54.0 %    MCV 89 82 - 98 fL    MCH 31.4 (H) 27.0 - 31.0 pg    MCHC 35.5 32.0 - 36.0 g/dL    RDW 15.1 (H) 11.5 - 14.5 %    Platelets 296 150 - 450 K/uL    MPV 10.7 9.2 - 12.9 fL    Immature Granulocytes 0.9 (H) 0.0 - 0.5 %    Gran # (ANC) 7.1 1.8 - 7.7 K/uL    Immature Grans " (Abs) 0.09 (H) 0.00 - 0.04 K/uL    Lymph # 1.7 1.0 - 4.8 K/uL    Mono # 1.1 (H) 0.3 - 1.0 K/uL    Eos # 0.1 0.0 - 0.5 K/uL    Baso # 0.06 0.00 - 0.20 K/uL    nRBC 0 0 /100 WBC    Gran % 69.8 38.0 - 73.0 %    Lymph % 16.4 (L) 18.0 - 48.0 %    Mono % 10.9 4.0 - 15.0 %    Eosinophil % 1.4 0.0 - 8.0 %    Basophil % 0.6 0.0 - 1.9 %    Differential Method Automated    Comprehensive metabolic panel   Result Value Ref Range    Sodium 135 (L) 136 - 145 mmol/L    Potassium 3.6 3.5 - 5.1 mmol/L    Chloride 103 95 - 110 mmol/L    CO2 21 (L) 23 - 29 mmol/L    Glucose 130 (H) 70 - 110 mg/dL    BUN 13 6 - 20 mg/dL    Creatinine 1.3 0.5 - 1.4 mg/dL    Calcium 8.6 (L) 8.7 - 10.5 mg/dL    Total Protein 7.0 6.0 - 8.4 g/dL    Albumin 3.1 (L) 3.5 - 5.2 g/dL    Total Bilirubin 6.0 (H) 0.1 - 1.0 mg/dL    Alkaline Phosphatase 134 55 - 135 U/L    AST 44 (H) 10 - 40 U/L    ALT 56 (H) 10 - 44 U/L    Anion Gap 11 8 - 16 mmol/L    eGFR >60 >60 mL/min/1.73 m^2   Lipase   Result Value Ref Range    Lipase 50 4 - 60 U/L   Urinalysis, Reflex to Urine Culture Urine, Clean Catch    Specimen: Urine   Result Value Ref Range    Specimen UA Urine, Clean Catch     Color, UA Yellow Yellow, Straw, Angle    Appearance, UA Clear Clear    pH, UA 6.0 5.0 - 8.0    Specific Gravity, UA 1.030 1.005 - 1.030    Protein, UA Trace (A) Negative    Glucose, UA Negative Negative    Ketones, UA Negative Negative    Bilirubin (UA) 2+ (A) Negative    Occult Blood UA Negative Negative    Nitrite, UA Negative Negative    Urobilinogen, UA 2.0-3.0 (A) <2.0 EU/dL    Leukocytes, UA Negative Negative   Ammonia   Result Value Ref Range    Ammonia 47 10 - 50 umol/L   Protime-INR   Result Value Ref Range    Prothrombin Time 10.0 9.0 - 12.5 sec    INR 0.9 0.8 - 1.2   CK   Result Value Ref Range     (H) 20 - 200 U/L        Imaging Results:  Imaging Results              CT Renal Stone Study ABD Pelvis WO (Final result)  Result time 02/20/23 16:52:37      Final result by Juan MONTIEL  MD Shereen (02/20/23 16:52:37)                   Impression:      Cholelithiasis without acute cholecystitis.  No definite acute abnormality.    All CT scans at this facility are performed  using dose modulation techniques as appropriate to performed exam including the following:  automated exposure control; adjustment of mA and/or kV according to the patients size (this includes techniques or standardized protocols for targeted exams where dose is matched to indication/reason for exam: i.e. extremities or head);  iterative reconstruction technique.      Electronically signed by: Juan Regan  Date:    02/20/2023  Time:    16:52               Narrative:    EXAMINATION:  CT RENAL STONE STUDY ABD PELVIS WO    CLINICAL HISTORY:  Flank pain, kidney stone suspected;    TECHNIQUE:  Low dose axial images, sagittal and coronal reformations were obtained from the lung bases to the pubic symphysis.  Contrast was not administered.    COMPARISON:  MRCP same date.    FINDINGS:  Heart: Normal in size. No pericardial effusion.    Lung Bases: Well aerated, without consolidation or pleural fluid.    Liver: Normal in size and attenuation, with no focal hepatic lesions.    Gallbladder: Cholelithiasis without findings to specifically suggest acute cholecystitis.    Bile Ducts: No evidence of dilated ducts.    Pancreas: No mass or peripancreatic fat stranding.    Spleen: Unremarkable.    Adrenals: Unremarkable.    Kidneys/ Ureters: No hydronephrosis.  No obstructive uropathy.  No nonobstructive nephrolithiasis.    Bladder: Contrast fills the bladder related to prior contrast enhanced MRI.    Reproductive organs: Unremarkable.    GI Tract/Mesentery: No evidence of bowel obstruction or inflammation.  No evidence of appendicitis.    Peritoneal Space: No ascites. No free air.    Retroperitoneum: No significant adenopathy.    Abdominal wall: Unremarkable.    Vasculature: No significant atherosclerosis or aneurysm.    Bones: No acute  fracture.                                                The Emergency Provider reviewed the vital signs and test results, which are outlined above.     ED Discussion       5:33 PM: Reassessed pt at this time. Discussed with pt all pertinent ED information and results. Discussed pt dx and plan of tx. Gave pt all f/u and return to the ED instructions. All questions and concerns were addressed at this time. Pt expresses understanding of information and instructions, and is comfortable with plan to discharge. Pt is stable for discharge.    I discussed with patient and/or family/caretaker that evaluation in the ED does not suggest any emergent or life threatening medical conditions requiring immediate intervention beyond what was provided in the ED, and I believe patient is safe for discharge.  Regardless, an unremarkable evaluation in the ED does not preclude the development or presence of a serious of life threatening condition. As such, patient was instructed to return immediately for any worsening or change in current symptoms.        Medical Decision Making:   Initial Assessment:   Recent trip to [place], with jaundice since then.  Has had multiple workups which have just revealed mild elevation in bilirubin and liver enyzmes. S tates his urine was dark today, so he came in  Differential Diagnosis:   Hepatitis, SBO  Clinical Tests:   Lab Tests: Ordered and Reviewed  Radiological Study: Ordered and Reviewed  ED Management:  Labs and imaging reviewed by me.  MRI done outpatient is normal, CT no sbo just cholelithiasis, but no cholecystitis.  Bilirubin and liver enzymes improving. Symptoms have improved in the ED with treatment. Will rx meds to take at home for vomiting and diarrhea.           ED Medication(s):  Medications   sodium chloride 0.9% bolus 1,000 mL 1,000 mL (0 mLs Intravenous Stopped 2/20/23 8121)   ondansetron injection 4 mg (4 mg Intravenous Given 2/20/23 4804)   sodium chloride 0.9% bolus 1,000 mL 1,000  mL (1,000 mLs Intravenous New Bag 2/20/23 3745)       Discharge Medication List as of 2/20/2023  5:37 PM        START taking these medications    Details   diphenoxylate-atropine 2.5-0.025 mg (LOMOTIL) 2.5-0.025 mg per tablet Take 1 tablet by mouth 4 (four) times daily as needed for Diarrhea., Starting Mon 2/20/2023, Until Thu 3/2/2023 at 2359, Normal      promethazine (PHENERGAN) 25 MG tablet Take 1 tablet (25 mg total) by mouth every 6 (six) hours as needed for Nausea., Starting Mon 2/20/2023, Normal              Follow-up Information       Pio Gomez MD.    Specialty: Family Medicine  Contact information:  [address] [place] [place] [address] [phone]                                 Scribe Attestation:   Scribe #1: I performed the above scribed service and the documentation accurately describes the services I performed. I attest to the accuracy of the note.     Attending:   Physician Attestation Statement for Scribe #1: I, Suhail Ray MD, personally performed the services described in this documentation, as scribed by Ioana Bee, in my presence, and it is both accurate and complete.           Clinical Impression       ICD-10-CM ICD-9-CM   1. Nausea and vomiting, unspecified vomiting type  R11.2 787.01   2. Diarrhea, unspecified type  R19.7 787.91       Disposition:   Disposition: Discharged  Condition: Stable      Suhail Ray MD  02/20/23 1223

## 2023-02-20 NOTE — FIRST PROVIDER EVALUATION
Medical screening examination initiated.  I have conducted a focused provider triage encounter, findings are as follows:    Brief history of present illness:  27-year-old male presents emergency department for dark urine and abdominal pain.  Patient reports he was seen at another care facility for liver failure.  Patient reports having MRI this morning    There were no vitals filed for this visit.    Pertinent physical exam:  nad    Brief workup plan:  labs, meds, imaging as needed    Preliminary workup initiated; this workup will be continued and followed by the physician or advanced practice provider that is assigned to the patient when roomed.

## 2023-02-21 ENCOUNTER — LAB VISIT (OUTPATIENT)
Dept: LAB | Facility: HOSPITAL | Age: 28
End: 2023-02-21
Attending: INTERNAL MEDICINE
Payer: COMMERCIAL

## 2023-02-21 ENCOUNTER — TELEPHONE (OUTPATIENT)
Dept: HEPATOLOGY | Facility: CLINIC | Age: 28
End: 2023-02-21
Payer: COMMERCIAL

## 2023-02-21 ENCOUNTER — OFFICE VISIT (OUTPATIENT)
Dept: HEPATOLOGY | Facility: CLINIC | Age: 28
End: 2023-02-21
Payer: COMMERCIAL

## 2023-02-21 VITALS
SYSTOLIC BLOOD PRESSURE: 130 MMHG | HEART RATE: 80 BPM | DIASTOLIC BLOOD PRESSURE: 80 MMHG | HEIGHT: 70 IN | WEIGHT: 186.31 LBS | BODY MASS INDEX: 26.67 KG/M2

## 2023-02-21 DIAGNOSIS — A09 DIARRHEA OF INFECTIOUS ORIGIN: ICD-10-CM

## 2023-02-21 DIAGNOSIS — R74.8 ELEVATED LIVER ENZYMES: ICD-10-CM

## 2023-02-21 DIAGNOSIS — K71.9 DRUG-INDUCED LIVER INJURY: Primary | ICD-10-CM

## 2023-02-21 LAB
BILIRUB DIRECT SERPL-MCNC: 5.1 MG/DL (ref 0.1–0.3)
CLINICAL BIOCHEMIST REVIEW: NORMAL
CLINICAL BIOCHEMIST REVIEW: NORMAL
HAPTOGLOB SERPL-MCNC: 113 MG/DL (ref 30–250)
HETEROPH AB SERPL QL IA: NEGATIVE
PLPETH BLD-MCNC: <10 NG/ML
PLPETH BLD-MCNC: <10 NG/ML
POPETH BLD-MCNC: <10 NG/ML
POPETH BLD-MCNC: <10 NG/ML

## 2023-02-21 PROCEDURE — 3079F PR MOST RECENT DIASTOLIC BLOOD PRESSURE 80-89 MM HG: ICD-10-PCS | Mod: CPTII,S$GLB,, | Performed by: INTERNAL MEDICINE

## 2023-02-21 PROCEDURE — 3008F PR BODY MASS INDEX (BMI) DOCUMENTED: ICD-10-PCS | Mod: CPTII,S$GLB,, | Performed by: INTERNAL MEDICINE

## 2023-02-21 PROCEDURE — 86308 HETEROPHILE ANTIBODY SCREEN: CPT | Performed by: INTERNAL MEDICINE

## 2023-02-21 PROCEDURE — 82248 BILIRUBIN DIRECT: CPT | Performed by: INTERNAL MEDICINE

## 2023-02-21 PROCEDURE — 86694 HERPES SIMPLEX NES ANTBDY: CPT | Performed by: INTERNAL MEDICINE

## 2023-02-21 PROCEDURE — 1159F PR MEDICATION LIST DOCUMENTED IN MEDICAL RECORD: ICD-10-PCS | Mod: CPTII,S$GLB,, | Performed by: INTERNAL MEDICINE

## 2023-02-21 PROCEDURE — 99214 OFFICE O/P EST MOD 30 MIN: CPT | Mod: S$GLB,,, | Performed by: INTERNAL MEDICINE

## 2023-02-21 PROCEDURE — 99999 PR PBB SHADOW E&M-EST. PATIENT-LVL III: ICD-10-PCS | Mod: PBBFAC,,, | Performed by: INTERNAL MEDICINE

## 2023-02-21 PROCEDURE — 99214 PR OFFICE/OUTPT VISIT, EST, LEVL IV, 30-39 MIN: ICD-10-PCS | Mod: S$GLB,,, | Performed by: INTERNAL MEDICINE

## 2023-02-21 PROCEDURE — 3075F SYST BP GE 130 - 139MM HG: CPT | Mod: CPTII,S$GLB,, | Performed by: INTERNAL MEDICINE

## 2023-02-21 PROCEDURE — 99999 PR PBB SHADOW E&M-EST. PATIENT-LVL III: CPT | Mod: PBBFAC,,, | Performed by: INTERNAL MEDICINE

## 2023-02-21 PROCEDURE — 3079F DIAST BP 80-89 MM HG: CPT | Mod: CPTII,S$GLB,, | Performed by: INTERNAL MEDICINE

## 2023-02-21 PROCEDURE — 3008F BODY MASS INDEX DOCD: CPT | Mod: CPTII,S$GLB,, | Performed by: INTERNAL MEDICINE

## 2023-02-21 PROCEDURE — 1159F MED LIST DOCD IN RCRD: CPT | Mod: CPTII,S$GLB,, | Performed by: INTERNAL MEDICINE

## 2023-02-21 PROCEDURE — 83010 ASSAY OF HAPTOGLOBIN QUANT: CPT | Performed by: INTERNAL MEDICINE

## 2023-02-21 PROCEDURE — 36415 COLL VENOUS BLD VENIPUNCTURE: CPT | Performed by: INTERNAL MEDICINE

## 2023-02-21 PROCEDURE — 3075F PR MOST RECENT SYSTOLIC BLOOD PRESS GE 130-139MM HG: ICD-10-PCS | Mod: CPTII,S$GLB,, | Performed by: INTERNAL MEDICINE

## 2023-02-21 NOTE — TELEPHONE ENCOUNTER
Returned patient's call per his request, patient is stating that he needs to be seen sooner he's feeling worse. Patient has been placed on the schedule today to see Dr. Quinones at 10:00.

## 2023-02-21 NOTE — PROGRESS NOTES
GI Clinic Note    Referring provider: No ref. provider found    Chief complaint:   Chief Complaint   Patient presents with    Liver Failure    dark urine    Fatigue    Diarrhea       Clinical Summary: Bennett Rubin is a 27 y.o. male who presents to clinic for elevated liver enzymes        2/23/23  Patient had ED visit for persistent symptoms  Most etiological labs work non diagnostic including MRI/MRCP    2/15/23   Patient was admitted [facility] 2/6/2023-2/7/2023 for evaluation of elevated liver enzymes Patient is here for second opinion. His labs today shows bilirubin 7 and elevated liver enzymes.     Patient mentions that he had visit  to [facility] a month ago during which he started having stomach upset and vomiting later he noticed jaundice.  He was admitted for evaluation at  [facility].  He mentions all the workup including viral hepatitis panel and ultrasound were negative and since his liver tests improved he was discharged after hydration. Per patient no diagnosis reached. No records available for review.  Today  Patient has significant nausea. Also complains itching and severo Coloured stools. Denies  vomiting, hematemesis, melena, or hematochezia.  He denies any signs of liver cell failure.  No encephalopathy or bleeding tendencies.  Patient has been taking anabolic steroids and testosterone last 5-6 months he has been off those  over last 3-4 weeks. No alternative or herbal medications. Taking Benadryl and colestipol for itching as prescribed during last hospitalization.     Abdominal pain - no  Reflux - no  Dysphagia - no   Bowel habits - normal  GI bleeding - none  NSAID usage - none    Denies alcohol      No past medical history on file.  Past Surgical History:   Procedure Laterality Date    CYST REMOVAL Left 2005    cyst removal under left nipple    RECONSTRUCTION OF FINGER Left     pinky finger     Family History   Problem Relation Age of Onset    Hypertension Father     Dementia Maternal Grandmother      Heart attack Paternal Grandmother     Heart attack Paternal Grandfather      Social History     Socioeconomic History    Marital status: Single   Tobacco Use    Smoking status: Former     Types: Vaping with nicotine    Smokeless tobacco: Never   Substance and Sexual Activity    Alcohol use: Not Currently    Drug use: Yes     Types: Anabolic steroids    Sexual activity: Yes     Partners: Female     Birth control/protection: Condom       Review of Systems:  14 point ROS negative except for above pertinent positives.    Review of Systems   Constitutional:  Negative for fever and weight loss.   HENT:  Negative for hearing loss and tinnitus.    Eyes:  Negative for double vision and photophobia.   Respiratory:  Negative for hemoptysis and wheezing.    Cardiovascular:  Negative for chest pain and palpitations.   Gastrointestinal:  Negative for blood in stool and melena.   Musculoskeletal:  Negative for falls.   Skin:  Negative for itching and rash.   Neurological:  Negative for focal weakness.   Endo/Heme/Allergies:  Negative for polydipsia.   Psychiatric/Behavioral:  Negative for hallucinations and suicidal ideas.          Objective:    Physical Exam:  Vitals:    02/21/23 1014   BP: 130/80   Pulse: 80         Physical Exam  Constitutional:       Appearance: Normal appearance.   HENT:      Head: Normocephalic and atraumatic.      Nose: No congestion.      Mouth/Throat:      Mouth: Mucous membranes are moist.   Eyes:      Pupils: Pupils are equal, round, and reactive to light.   Cardiovascular:      Heart sounds: No murmur heard.    No friction rub.   Pulmonary:      Effort: No respiratory distress.      Breath sounds: No stridor. No rhonchi.   Abdominal:      General: There is no distension.      Palpations: There is no mass.      Tenderness: There is no abdominal tenderness.   Musculoskeletal:         General: No swelling or deformity.      Cervical back: No rigidity or tenderness.   Skin:     Coloration: Skin is  jaundiced.      Findings: Bruising present.   Neurological:      Mental Status: He is alert.      Cranial Nerves: No cranial nerve deficit.      Motor: No weakness.   Psychiatric:         Mood and Affect: Mood normal.         Behavior: Behavior normal.         Pertinent Labs:     Admission on 02/20/2023, Discharged on 02/20/2023   Component Date Value Ref Range Status    WBC 02/20/2023 10.09  3.90 - 12.70 K/uL Final    RBC 02/20/2023 4.52 (L)  4.60 - 6.20 M/uL Final    Hemoglobin 02/20/2023 14.2  14.0 - 18.0 g/dL Final    Hematocrit 02/20/2023 40.0  40.0 - 54.0 % Final    MCV 02/20/2023 89  82 - 98 fL Final    MCH 02/20/2023 31.4 (H)  27.0 - 31.0 pg Final    MCHC 02/20/2023 35.5  32.0 - 36.0 g/dL Final    RDW 02/20/2023 15.1 (H)  11.5 - 14.5 % Final    Platelets 02/20/2023 296  150 - 450 K/uL Final    MPV 02/20/2023 10.7  9.2 - 12.9 fL Final    Immature Granulocytes 02/20/2023 0.9 (H)  0.0 - 0.5 % Final    Gran # (ANC) 02/20/2023 7.1  1.8 - 7.7 K/uL Final    Immature Grans (Abs) 02/20/2023 0.09 (H)  0.00 - 0.04 K/uL Final    Lymph # 02/20/2023 1.7  1.0 - 4.8 K/uL Final    Mono # 02/20/2023 1.1 (H)  0.3 - 1.0 K/uL Final    Eos # 02/20/2023 0.1  0.0 - 0.5 K/uL Final    Baso # 02/20/2023 0.06  0.00 - 0.20 K/uL Final    nRBC 02/20/2023 0  0 /100 WBC Final    Gran % 02/20/2023 69.8  38.0 - 73.0 % Final    Lymph % 02/20/2023 16.4 (L)  18.0 - 48.0 % Final    Mono % 02/20/2023 10.9  4.0 - 15.0 % Final    Eosinophil % 02/20/2023 1.4  0.0 - 8.0 % Final    Basophil % 02/20/2023 0.6  0.0 - 1.9 % Final    Differential Method 02/20/2023 Automated   Final    Sodium 02/20/2023 135 (L)  136 - 145 mmol/L Final    Potassium 02/20/2023 3.6  3.5 - 5.1 mmol/L Final    Chloride 02/20/2023 103  95 - 110 mmol/L Final    CO2 02/20/2023 21 (L)  23 - 29 mmol/L Final    Glucose 02/20/2023 130 (H)  70 - 110 mg/dL Final    BUN 02/20/2023 13  6 - 20 mg/dL Final    Creatinine 02/20/2023 1.3  0.5 - 1.4 mg/dL Final    Calcium 02/20/2023 8.6 (L)  8.7  - 10.5 mg/dL Final    Total Protein 02/20/2023 7.0  6.0 - 8.4 g/dL Final    Albumin 02/20/2023 3.1 (L)  3.5 - 5.2 g/dL Final    Total Bilirubin 02/20/2023 6.0 (H)  0.1 - 1.0 mg/dL Final    Alkaline Phosphatase 02/20/2023 134  55 - 135 U/L Final    AST 02/20/2023 44 (H)  10 - 40 U/L Final    ALT 02/20/2023 56 (H)  10 - 44 U/L Final    Anion Gap 02/20/2023 11  8 - 16 mmol/L Final    eGFR 02/20/2023 >60  >60 mL/min/1.73 m^2 Final    Lipase 02/20/2023 50  4 - 60 U/L Final    Specimen UA 02/20/2023 Urine, Clean Catch   Final    Color, UA 02/20/2023 Yellow  Yellow, Straw, Angle Final    Appearance, UA 02/20/2023 Clear  Clear Final    pH, UA 02/20/2023 6.0  5.0 - 8.0 Final    Specific Gravity, UA 02/20/2023 1.030  1.005 - 1.030 Final    Protein, UA 02/20/2023 Trace (A)  Negative Final    Glucose, UA 02/20/2023 Negative  Negative Final    Ketones, UA 02/20/2023 Negative  Negative Final    Bilirubin (UA) 02/20/2023 2+ (A)  Negative Final    Occult Blood UA 02/20/2023 Negative  Negative Final    Nitrite, UA 02/20/2023 Negative  Negative Final    Urobilinogen, UA 02/20/2023 2.0-3.0 (A)  <2.0 EU/dL Final    Leukocytes, UA 02/20/2023 Negative  Negative Final    Ammonia 02/20/2023 47  10 - 50 umol/L Final    Prothrombin Time 02/20/2023 10.0  9.0 - 12.5 sec Final    INR 02/20/2023 0.9  0.8 - 1.2 Final    CPK 02/20/2023 267 (H)  20 - 200 U/L Final   Lab Visit on 02/15/2023   Component Date Value Ref Range Status    WBC 02/15/2023 8.91  3.90 - 12.70 K/uL Final    RBC 02/15/2023 5.16  4.60 - 6.20 M/uL Final    Hemoglobin 02/15/2023 15.6  14.0 - 18.0 g/dL Final    Hematocrit 02/15/2023 47.4  40.0 - 54.0 % Final    MCV 02/15/2023 92  82 - 98 fL Final    MCH 02/15/2023 30.2  27.0 - 31.0 pg Final    MCHC 02/15/2023 32.9  32.0 - 36.0 g/dL Final    RDW 02/15/2023 15.8 (H)  11.5 - 14.5 % Final    Platelets 02/15/2023 345  150 - 450 K/uL Final    MPV 02/15/2023 12.2  9.2 - 12.9 fL Final    Immature Granulocytes 02/15/2023 1.2 (H)  0.0 - 0.5  % Final    Gran # (ANC) 02/15/2023 6.1  1.8 - 7.7 K/uL Final    Immature Grans (Abs) 02/15/2023 0.11 (H)  0.00 - 0.04 K/uL Final    Lymph # 02/15/2023 1.8  1.0 - 4.8 K/uL Final    Mono # 02/15/2023 0.8  0.3 - 1.0 K/uL Final    Eos # 02/15/2023 0.1  0.0 - 0.5 K/uL Final    Baso # 02/15/2023 0.06  0.00 - 0.20 K/uL Final    nRBC 02/15/2023 0  0 /100 WBC Final    Gran % 02/15/2023 68.0  38.0 - 73.0 % Final    Lymph % 02/15/2023 19.9  18.0 - 48.0 % Final    Mono % 02/15/2023 9.1  4.0 - 15.0 % Final    Eosinophil % 02/15/2023 1.1  0.0 - 8.0 % Final    Basophil % 02/15/2023 0.7  0.0 - 1.9 % Final    Differential Method 02/15/2023 Automated   Final    Sodium 02/15/2023 134 (L)  136 - 145 mmol/L Final    Potassium 02/15/2023 3.6  3.5 - 5.1 mmol/L Final    Chloride 02/15/2023 100  95 - 110 mmol/L Final    CO2 02/15/2023 23  23 - 29 mmol/L Final    Glucose 02/15/2023 94  70 - 110 mg/dL Final    BUN 02/15/2023 17  6 - 20 mg/dL Final    Creatinine 02/15/2023 1.2  0.5 - 1.4 mg/dL Final    Calcium 02/15/2023 9.4  8.7 - 10.5 mg/dL Final    Total Protein 02/15/2023 7.3  6.0 - 8.4 g/dL Final    Albumin 02/15/2023 3.4 (L)  3.5 - 5.2 g/dL Final    Total Bilirubin 02/15/2023 6.7 (H)  0.1 - 1.0 mg/dL Final    Alkaline Phosphatase 02/15/2023 119  55 - 135 U/L Final    AST 02/15/2023 48 (H)  10 - 40 U/L Final    ALT 02/15/2023 71 (H)  10 - 44 U/L Final    Anion Gap 02/15/2023 11  8 - 16 mmol/L Final    eGFR 02/15/2023 >60.0  >60 mL/min/1.73 m^2 Final    Prothrombin Time 02/15/2023 9.8  9.0 - 12.5 sec Final    INR 02/15/2023 0.9  0.8 - 1.2 Final    PEth 16:0/18.1 (POPEth) 02/15/2023 <10  Cutoff: 10 ng/mL Final    PEth 16:0/18.2 (PLPEth) 02/15/2023 <10  Cutoff: 10 ng/mL Final    PETH INTERPRETATION 02/15/2023 Negative.   Final    AFP 02/15/2023 <2.0  0.0 - 8.4 ng/mL Final    RAQUEL Screen 02/15/2023 Negative <1:80  Negative <1:80 Final    A-1 Antitrypsin 02/15/2023 190  100 - 190 mg/dL Final    Anti-Mitochon Ab IFA 02/15/2023 Negative 1:40   Negative Final    Smooth Muscle Ab 02/15/2023 Negative 1:40  Negative Final    Ceruloplasmin 02/15/2023 48.0 (H)  15.0 - 45.0 mg/dL Final    A-1 Antitrypsin 02/15/2023 190  100 - 190 mg/dL Final    Acetaminophen (Tylenol), Serum 02/15/2023 <3.0 (L)  10.0 - 20.0 ug/mL Final    Anti-Liver/Kidney Microsome Ab 02/15/2023 1.8  <=20 UNITS Final    Anti-Mitochon Ab IFA 02/15/2023 Negative 1:40  Negative Final    Smooth Muscle Ab 02/15/2023 Negative 1:40  Negative Final    Antigliadin Abs, IgA 02/15/2023 1.1  <7.0 U/mL Final    Antigliadin Ab IgG 02/15/2023 1.8  <7.0 U/mL Final    TTG IgA 02/15/2023 1.4  <7.0 U/mL Final    TTG IgG 02/15/2023 0.9  <7.0 U/mL Final    Immunoglobulin A (IgA) 02/15/2023 358  70 - 400 mg/dL Final    Ceruloplasmin 02/15/2023 48.0 (H)  15.0 - 45.0 mg/dL Final    Cytomegalovirus PCR, Quant 02/15/2023 Not Detected  <50 IU/mL Final    Cytomegalovirus DNA 02/15/2023 Not Detected  Not Detected Final    Cytomegalovirus Log (IU/mL) 02/15/2023 Not Detected  <1.70 LogIU/mL Final    Hep B C IgM 02/15/2023 Non-reactive  Non-reactive Final    IgG 02/15/2023 1209  650 - 1600 mg/dL Final    HSV Ab, IgM by EIA 02/15/2023 0.12  <=0.90 INDEX Final    Hepatitis B Surface Ag 02/15/2023 Non-reactive  Non-reactive Final    Hep B C IgM 02/15/2023 Non-reactive  Non-reactive Final    Hep A IgM 02/15/2023 Non-reactive  Non-reactive Final    Hepatitis C Ab 02/15/2023 Non-reactive  Non-reactive Final    TSH 02/15/2023 1.107  0.400 - 4.000 uIU/mL Final    PEth 16:0/18.1 (POPEth) 02/15/2023 <10  Cutoff: 10 ng/mL Final    PEth 16:0/18.2 (PLPEth) 02/15/2023 <10  Cutoff: 10 ng/mL Final    PETH INTERPRETATION 02/15/2023 Negative.   Final   Office Visit on 02/14/2023   Component Date Value Ref Range Status    Color, UA 02/14/2023 Angle   Final    pH, UA 02/14/2023 5   Final    WBC, UA 02/14/2023 neg   Final    Nitrite, UA 02/14/2023 neg   Final    Protein, POC 02/14/2023 neg   Final    Glucose, UA 02/14/2023 nor   Final    Ketones,  UA 02/14/2023 neg   Final    Urobilinogen, UA 02/14/2023 nor   Final    Bilirubin, POC 02/14/2023 ++   Final    Blood, UA 02/14/2023 neg   Final    Clarity, UA 02/14/2023 Clear   Final    Spec Grav UA 02/14/2023 1.020   Final   Lab Visit on 02/14/2023   Component Date Value Ref Range Status    Total Protein 02/14/2023 7.5  6.0 - 8.4 g/dL Final    Albumin 02/14/2023 3.4 (L)  3.5 - 5.2 g/dL Final    Total Bilirubin 02/14/2023 6.9 (H)  0.1 - 1.0 mg/dL Final    Bilirubin, Direct 02/14/2023 5.0 (H)  0.1 - 0.3 mg/dL Final    AST 02/14/2023 51 (H)  10 - 40 U/L Final    ALT 02/14/2023 78 (H)  10 - 44 U/L Final    Alkaline Phosphatase 02/14/2023 119  55 - 135 U/L Final    Sodium 02/14/2023 136  136 - 145 mmol/L Final    Potassium 02/14/2023 4.0  3.5 - 5.1 mmol/L Final    Chloride 02/14/2023 101  95 - 110 mmol/L Final    CO2 02/14/2023 25  23 - 29 mmol/L Final    Glucose 02/14/2023 94  70 - 110 mg/dL Final    BUN 02/14/2023 15  6 - 20 mg/dL Final    Creatinine 02/14/2023 1.3  0.5 - 1.4 mg/dL Final    Calcium 02/14/2023 9.3  8.7 - 10.5 mg/dL Final    Total Protein 02/14/2023 7.5  6.0 - 8.4 g/dL Final    Albumin 02/14/2023 3.4 (L)  3.5 - 5.2 g/dL Final    Total Bilirubin 02/14/2023 6.9 (H)  0.1 - 1.0 mg/dL Final    Alkaline Phosphatase 02/14/2023 119  55 - 135 U/L Final    AST 02/14/2023 51 (H)  10 - 40 U/L Final    ALT 02/14/2023 78 (H)  10 - 44 U/L Final    Anion Gap 02/14/2023 10  8 - 16 mmol/L Final    eGFR 02/14/2023 >60.0  >60 mL/min/1.73 m^2 Final    CPK 02/14/2023 343 (H)  20 - 200 U/L Final   Office Visit on 02/02/2023   Component Date Value Ref Range Status    Chlamydia, Amplified DNA 02/02/2023 Not Detected  Not Detected Final    N gonorrhoeae, amplified DNA 02/02/2023 Not Detected  Not Detected Final    Trichomonas vaginalis RNA, Qual, s* 02/02/2023 See Below   Final    Trichomonas vaginalis, QL, TMA 02/02/2023 Not detected   Final   Office Visit on 12/23/2022   Component Date Value Ref Range Status    Chlamydia,  Amplified DNA 12/23/2022 Detected (A)  Not Detected Final    N gonorrhoeae, amplified DNA 12/23/2022 Not Detected  Not Detected Final    Trichomonas vaginalis RNA, Qual, s* 12/23/2022 See Below   Final    Trichomonas vaginalis, QL, TMA 12/23/2022 Not detected   Final        Imaging:  No images are attached to the encounter.    Assessment:  Problem List Items Addressed This Visit          GI    Elevated liver enzymes    Relevant Orders    CBC Auto Differential    Comprehensive Metabolic Panel    Protime-INR    Drug-induced liver injury - Primary     Other Visit Diagnoses       Diarrhea of infectious origin        Relevant Orders    Giardia / Cryptosporidum, EIA    Clostridium difficile EIA    Stool culture    Stool Exam-Ova,Cysts,Parasites    POCT Infectious Mononucleosis Antibody    HSV 1 & 2, IgM    HETEROPHILE AB SCREEN    BILIRUBIN, DIRECT    HAPTOGLOBIN              Bennett was seen today for liver failure, dark urine, fatigue and diarrhea.    Diagnoses and all orders for this visit:    Drug-induced liver injury    Diarrhea of infectious origin  -     Giardia / Cryptosporidum, EIA; Future  -     Clostridium difficile EIA  -     Stool culture; Future  -     Stool Exam-Ova,Cysts,Parasites; Future  -     POCT Infectious Mononucleosis Antibody  -     HSV 1 & 2, IgM; Future  -     HETEROPHILE AB SCREEN; Future  -     BILIRUBIN, DIRECT; Future  -     HAPTOGLOBIN; Future    Elevated liver enzymes  -     CBC Auto Differential; Standing  -     Comprehensive Metabolic Panel; Standing  -     Protime-INR; Standing            Plan:  -Avoid all Hepatotoxic medication  -Close monitoring for signs of Acute liver failure  -Discussed further evaluation including liver biopsy if necessary      No follow-ups on file.      Time Statement  A total Time Includes preparing to see patient, reviewing  diagnostic studies and records, direct face-to-face visit, completing orders, medications , reconciliation, prescription management, and  care coordination    We discussed in depth the nature of the patient's disease, the management plan in details. I have provided the patient with an opportunity to ask questions and have all questions answered to his satisfaction.       JACINTO FORD MD  Gastroenterology & Transplant Hepatology  [facility] [place] [facility] [place]

## 2023-02-21 NOTE — TELEPHONE ENCOUNTER
----- Message from Esha Briceno sent at 2/21/2023  6:48 AM CST -----  Pt is requesting a call back in regards to getting an follow up appt schedule and to review his test results. No appt would pull up until June. Pt can be reached at [phone] (home)

## 2023-02-24 LAB
HEV IGM SER QL: NOT DETECTED
HSV AB, IGM BY EIA: 0.36 INDEX

## 2023-02-27 ENCOUNTER — LAB VISIT (OUTPATIENT)
Dept: LAB | Facility: HOSPITAL | Age: 28
End: 2023-02-27
Attending: INTERNAL MEDICINE
Payer: COMMERCIAL

## 2023-02-27 DIAGNOSIS — R74.8 ELEVATED LIVER ENZYMES: ICD-10-CM

## 2023-02-27 LAB
ALBUMIN SERPL BCP-MCNC: 3.3 G/DL (ref 3.5–5.2)
ALP SERPL-CCNC: 117 U/L (ref 55–135)
ALT SERPL W/O P-5'-P-CCNC: 85 U/L (ref 10–44)
ANION GAP SERPL CALC-SCNC: 10 MMOL/L (ref 8–16)
AST SERPL-CCNC: 50 U/L (ref 10–40)
BILIRUB SERPL-MCNC: 5.7 MG/DL (ref 0.1–1)
BUN SERPL-MCNC: 13 MG/DL (ref 6–20)
CALCIUM SERPL-MCNC: 9.3 MG/DL (ref 8.7–10.5)
CHLORIDE SERPL-SCNC: 100 MMOL/L (ref 95–110)
CO2 SERPL-SCNC: 24 MMOL/L (ref 23–29)
CREAT SERPL-MCNC: 1.2 MG/DL (ref 0.5–1.4)
EST. GFR  (NO RACE VARIABLE): >60 ML/MIN/1.73 M^2
GLUCOSE SERPL-MCNC: 98 MG/DL (ref 70–110)
INR PPP: 0.9 (ref 0.8–1.2)
POTASSIUM SERPL-SCNC: 4 MMOL/L (ref 3.5–5.1)
PROT SERPL-MCNC: 7.2 G/DL (ref 6–8.4)
PROTHROMBIN TIME: 9.8 SEC (ref 9–12.5)
SODIUM SERPL-SCNC: 134 MMOL/L (ref 136–145)

## 2023-02-27 PROCEDURE — 80053 COMPREHEN METABOLIC PANEL: CPT | Performed by: INTERNAL MEDICINE

## 2023-02-27 PROCEDURE — 36415 COLL VENOUS BLD VENIPUNCTURE: CPT | Mod: PN | Performed by: INTERNAL MEDICINE

## 2023-02-27 PROCEDURE — 85610 PROTHROMBIN TIME: CPT | Performed by: INTERNAL MEDICINE

## 2023-02-27 PROCEDURE — 85025 COMPLETE CBC W/AUTO DIFF WBC: CPT | Performed by: INTERNAL MEDICINE

## 2023-02-28 LAB
BASOPHILS # BLD AUTO: 0.06 K/UL (ref 0–0.2)
BASOPHILS NFR BLD: 0.6 % (ref 0–1.9)
DIFFERENTIAL METHOD: ABNORMAL
EOSINOPHIL # BLD AUTO: 0.2 K/UL (ref 0–0.5)
EOSINOPHIL NFR BLD: 1.5 % (ref 0–8)
ERYTHROCYTE [DISTWIDTH] IN BLOOD BY AUTOMATED COUNT: 14.6 % (ref 11.5–14.5)
HCT VFR BLD AUTO: 43.2 % (ref 40–54)
HDV AB SER QL IA: NEGATIVE
HGB BLD-MCNC: 14.3 G/DL (ref 14–18)
IMM GRANULOCYTES # BLD AUTO: 0.12 K/UL (ref 0–0.04)
IMM GRANULOCYTES NFR BLD AUTO: 1.2 % (ref 0–0.5)
LYMPHOCYTES # BLD AUTO: 1.8 K/UL (ref 1–4.8)
LYMPHOCYTES NFR BLD: 18.1 % (ref 18–48)
MCH RBC QN AUTO: 31.3 PG (ref 27–31)
MCHC RBC AUTO-ENTMCNC: 33.1 G/DL (ref 32–36)
MCV RBC AUTO: 95 FL (ref 82–98)
MONOCYTES # BLD AUTO: 1.1 K/UL (ref 0.3–1)
MONOCYTES NFR BLD: 10.5 % (ref 4–15)
NEUTROPHILS # BLD AUTO: 6.9 K/UL (ref 1.8–7.7)
NEUTROPHILS NFR BLD: 68.1 % (ref 38–73)
NRBC BLD-RTO: 0 /100 WBC
PLATELET # BLD AUTO: 367 K/UL (ref 150–450)
PMV BLD AUTO: 12.7 FL (ref 9.2–12.9)
RBC # BLD AUTO: 4.57 M/UL (ref 4.6–6.2)
WBC # BLD AUTO: 10.15 K/UL (ref 3.9–12.7)

## 2023-03-01 ENCOUNTER — PATIENT MESSAGE (OUTPATIENT)
Dept: GASTROENTEROLOGY | Facility: CLINIC | Age: 28
End: 2023-03-01
Payer: COMMERCIAL

## 2023-03-02 ENCOUNTER — PATIENT MESSAGE (OUTPATIENT)
Dept: RESEARCH | Facility: HOSPITAL | Age: 28
End: 2023-03-02
Payer: COMMERCIAL

## 2023-03-13 ENCOUNTER — LAB VISIT (OUTPATIENT)
Dept: LAB | Facility: HOSPITAL | Age: 28
End: 2023-03-13
Attending: INTERNAL MEDICINE
Payer: COMMERCIAL

## 2023-03-13 DIAGNOSIS — R74.8 ELEVATED LIVER ENZYMES: ICD-10-CM

## 2023-03-13 LAB
ALBUMIN SERPL BCP-MCNC: 3.7 G/DL (ref 3.5–5.2)
ALP SERPL-CCNC: 134 U/L (ref 55–135)
ALT SERPL W/O P-5'-P-CCNC: 70 U/L (ref 10–44)
ANION GAP SERPL CALC-SCNC: 9 MMOL/L (ref 8–16)
AST SERPL-CCNC: 51 U/L (ref 10–40)
BASOPHILS # BLD AUTO: 0.1 K/UL (ref 0–0.2)
BASOPHILS NFR BLD: 1.5 % (ref 0–1.9)
BILIRUB SERPL-MCNC: 4.3 MG/DL (ref 0.1–1)
BUN SERPL-MCNC: 16 MG/DL (ref 6–20)
CALCIUM SERPL-MCNC: 9.9 MG/DL (ref 8.7–10.5)
CHLORIDE SERPL-SCNC: 102 MMOL/L (ref 95–110)
CO2 SERPL-SCNC: 27 MMOL/L (ref 23–29)
CREAT SERPL-MCNC: 1.3 MG/DL (ref 0.5–1.4)
DIFFERENTIAL METHOD: ABNORMAL
EOSINOPHIL # BLD AUTO: 0.1 K/UL (ref 0–0.5)
EOSINOPHIL NFR BLD: 1.9 % (ref 0–8)
ERYTHROCYTE [DISTWIDTH] IN BLOOD BY AUTOMATED COUNT: 14.2 % (ref 11.5–14.5)
EST. GFR  (NO RACE VARIABLE): >60 ML/MIN/1.73 M^2
GLUCOSE SERPL-MCNC: 94 MG/DL (ref 70–110)
HCT VFR BLD AUTO: 46.2 % (ref 40–54)
HGB BLD-MCNC: 15.4 G/DL (ref 14–18)
IMM GRANULOCYTES # BLD AUTO: 0.09 K/UL (ref 0–0.04)
IMM GRANULOCYTES NFR BLD AUTO: 1.3 % (ref 0–0.5)
INR PPP: 0.9 (ref 0.8–1.2)
LYMPHOCYTES # BLD AUTO: 1.7 K/UL (ref 1–4.8)
LYMPHOCYTES NFR BLD: 24.8 % (ref 18–48)
MCH RBC QN AUTO: 31.5 PG (ref 27–31)
MCHC RBC AUTO-ENTMCNC: 33.3 G/DL (ref 32–36)
MCV RBC AUTO: 95 FL (ref 82–98)
MONOCYTES # BLD AUTO: 0.8 K/UL (ref 0.3–1)
MONOCYTES NFR BLD: 11.4 % (ref 4–15)
NEUTROPHILS # BLD AUTO: 4 K/UL (ref 1.8–7.7)
NEUTROPHILS NFR BLD: 59.1 % (ref 38–73)
NRBC BLD-RTO: 0 /100 WBC
PLATELET # BLD AUTO: 341 K/UL (ref 150–450)
PMV BLD AUTO: 11.7 FL (ref 9.2–12.9)
POTASSIUM SERPL-SCNC: 4.6 MMOL/L (ref 3.5–5.1)
PROT SERPL-MCNC: 8 G/DL (ref 6–8.4)
PROTHROMBIN TIME: 9.9 SEC (ref 9–12.5)
RBC # BLD AUTO: 4.89 M/UL (ref 4.6–6.2)
SODIUM SERPL-SCNC: 138 MMOL/L (ref 136–145)
WBC # BLD AUTO: 6.69 K/UL (ref 3.9–12.7)

## 2023-03-13 PROCEDURE — 36415 COLL VENOUS BLD VENIPUNCTURE: CPT | Mod: PN | Performed by: INTERNAL MEDICINE

## 2023-03-13 PROCEDURE — 85610 PROTHROMBIN TIME: CPT | Performed by: INTERNAL MEDICINE

## 2023-03-13 PROCEDURE — 80053 COMPREHEN METABOLIC PANEL: CPT | Performed by: INTERNAL MEDICINE

## 2023-03-13 PROCEDURE — 85025 COMPLETE CBC W/AUTO DIFF WBC: CPT | Performed by: INTERNAL MEDICINE

## 2023-03-22 ENCOUNTER — LAB VISIT (OUTPATIENT)
Dept: LAB | Facility: HOSPITAL | Age: 28
End: 2023-03-22
Attending: INTERNAL MEDICINE
Payer: COMMERCIAL

## 2023-03-22 DIAGNOSIS — K72.00 SUBACUTE LIVER FAILURE WITHOUT HEPATIC COMA: ICD-10-CM

## 2023-03-22 DIAGNOSIS — R63.4 WEIGHT LOSS: ICD-10-CM

## 2023-03-22 LAB
ALBUMIN SERPL BCP-MCNC: 3.6 G/DL (ref 3.5–5.2)
ALP SERPL-CCNC: 118 U/L (ref 55–135)
ALT SERPL W/O P-5'-P-CCNC: 76 U/L (ref 10–44)
ANION GAP SERPL CALC-SCNC: 11 MMOL/L (ref 8–16)
AST SERPL-CCNC: 61 U/L (ref 10–40)
BASOPHILS # BLD AUTO: 0.06 K/UL (ref 0–0.2)
BASOPHILS NFR BLD: 0.8 % (ref 0–1.9)
BILIRUB SERPL-MCNC: 2.6 MG/DL (ref 0.1–1)
BUN SERPL-MCNC: 19 MG/DL (ref 6–20)
CALCIUM SERPL-MCNC: 10.1 MG/DL (ref 8.7–10.5)
CHLORIDE SERPL-SCNC: 105 MMOL/L (ref 95–110)
CO2 SERPL-SCNC: 22 MMOL/L (ref 23–29)
CREAT SERPL-MCNC: 1.1 MG/DL (ref 0.5–1.4)
DIFFERENTIAL METHOD: ABNORMAL
EOSINOPHIL # BLD AUTO: 0.1 K/UL (ref 0–0.5)
EOSINOPHIL NFR BLD: 1.1 % (ref 0–8)
ERYTHROCYTE [DISTWIDTH] IN BLOOD BY AUTOMATED COUNT: 13.8 % (ref 11.5–14.5)
EST. GFR  (NO RACE VARIABLE): >60 ML/MIN/1.73 M^2
GLUCOSE SERPL-MCNC: 97 MG/DL (ref 70–110)
HCT VFR BLD AUTO: 43.8 % (ref 40–54)
HGB BLD-MCNC: 15.1 G/DL (ref 14–18)
IMM GRANULOCYTES # BLD AUTO: 0.05 K/UL (ref 0–0.04)
IMM GRANULOCYTES NFR BLD AUTO: 0.7 % (ref 0–0.5)
INR PPP: 0.9 (ref 0.8–1.2)
LYMPHOCYTES # BLD AUTO: 1.7 K/UL (ref 1–4.8)
LYMPHOCYTES NFR BLD: 22.9 % (ref 18–48)
MCH RBC QN AUTO: 31.4 PG (ref 27–31)
MCHC RBC AUTO-ENTMCNC: 34.5 G/DL (ref 32–36)
MCV RBC AUTO: 91 FL (ref 82–98)
MONOCYTES # BLD AUTO: 0.8 K/UL (ref 0.3–1)
MONOCYTES NFR BLD: 11.3 % (ref 4–15)
NEUTROPHILS # BLD AUTO: 4.7 K/UL (ref 1.8–7.7)
NEUTROPHILS NFR BLD: 63.2 % (ref 38–73)
NRBC BLD-RTO: 0 /100 WBC
PLATELET # BLD AUTO: 365 K/UL (ref 150–450)
PMV BLD AUTO: 11.2 FL (ref 9.2–12.9)
POTASSIUM SERPL-SCNC: 4.3 MMOL/L (ref 3.5–5.1)
PROT SERPL-MCNC: 7.7 G/DL (ref 6–8.4)
PROTHROMBIN TIME: 9.8 SEC (ref 9–12.5)
RBC # BLD AUTO: 4.81 M/UL (ref 4.6–6.2)
SODIUM SERPL-SCNC: 138 MMOL/L (ref 136–145)
WBC # BLD AUTO: 7.37 K/UL (ref 3.9–12.7)

## 2023-03-22 PROCEDURE — 36415 COLL VENOUS BLD VENIPUNCTURE: CPT | Mod: PN | Performed by: INTERNAL MEDICINE

## 2023-03-22 PROCEDURE — 85610 PROTHROMBIN TIME: CPT | Performed by: INTERNAL MEDICINE

## 2023-03-22 PROCEDURE — 80053 COMPREHEN METABOLIC PANEL: CPT | Performed by: INTERNAL MEDICINE

## 2023-03-22 PROCEDURE — 85025 COMPLETE CBC W/AUTO DIFF WBC: CPT | Performed by: INTERNAL MEDICINE

## 2023-03-30 ENCOUNTER — TELEPHONE (OUTPATIENT)
Dept: GASTROENTEROLOGY | Facility: CLINIC | Age: 28
End: 2023-03-30
Payer: COMMERCIAL

## 2023-04-19 ENCOUNTER — PATIENT MESSAGE (OUTPATIENT)
Dept: GASTROENTEROLOGY | Facility: CLINIC | Age: 28
End: 2023-04-19
Payer: COMMERCIAL

## 2023-04-26 ENCOUNTER — OFFICE VISIT (OUTPATIENT)
Dept: INTERNAL MEDICINE | Facility: CLINIC | Age: 28
End: 2023-04-26
Payer: COMMERCIAL

## 2023-04-26 ENCOUNTER — PATIENT MESSAGE (OUTPATIENT)
Dept: INTERNAL MEDICINE | Facility: CLINIC | Age: 28
End: 2023-04-26
Payer: COMMERCIAL

## 2023-04-26 VITALS
HEART RATE: 80 BPM | TEMPERATURE: 97 F | BODY MASS INDEX: 27.84 KG/M2 | WEIGHT: 194 LBS | SYSTOLIC BLOOD PRESSURE: 120 MMHG | OXYGEN SATURATION: 99 % | DIASTOLIC BLOOD PRESSURE: 82 MMHG

## 2023-04-26 DIAGNOSIS — M25.512 ACUTE PAIN OF LEFT SHOULDER: Primary | ICD-10-CM

## 2023-04-26 PROCEDURE — 99213 PR OFFICE/OUTPT VISIT, EST, LEVL III, 20-29 MIN: ICD-10-PCS | Mod: S$GLB,,, | Performed by: NURSE PRACTITIONER

## 2023-04-26 PROCEDURE — 3079F PR MOST RECENT DIASTOLIC BLOOD PRESSURE 80-89 MM HG: ICD-10-PCS | Mod: CPTII,S$GLB,, | Performed by: NURSE PRACTITIONER

## 2023-04-26 PROCEDURE — 3074F PR MOST RECENT SYSTOLIC BLOOD PRESSURE < 130 MM HG: ICD-10-PCS | Mod: CPTII,S$GLB,, | Performed by: NURSE PRACTITIONER

## 2023-04-26 PROCEDURE — 3079F DIAST BP 80-89 MM HG: CPT | Mod: CPTII,S$GLB,, | Performed by: NURSE PRACTITIONER

## 2023-04-26 PROCEDURE — 3008F BODY MASS INDEX DOCD: CPT | Mod: CPTII,S$GLB,, | Performed by: NURSE PRACTITIONER

## 2023-04-26 PROCEDURE — 1160F RVW MEDS BY RX/DR IN RCRD: CPT | Mod: CPTII,S$GLB,, | Performed by: NURSE PRACTITIONER

## 2023-04-26 PROCEDURE — 3008F PR BODY MASS INDEX (BMI) DOCUMENTED: ICD-10-PCS | Mod: CPTII,S$GLB,, | Performed by: NURSE PRACTITIONER

## 2023-04-26 PROCEDURE — 99999 PR PBB SHADOW E&M-EST. PATIENT-LVL IV: CPT | Mod: PBBFAC,,, | Performed by: NURSE PRACTITIONER

## 2023-04-26 PROCEDURE — 1160F PR REVIEW ALL MEDS BY PRESCRIBER/CLIN PHARMACIST DOCUMENTED: ICD-10-PCS | Mod: CPTII,S$GLB,, | Performed by: NURSE PRACTITIONER

## 2023-04-26 PROCEDURE — 1159F MED LIST DOCD IN RCRD: CPT | Mod: CPTII,S$GLB,, | Performed by: NURSE PRACTITIONER

## 2023-04-26 PROCEDURE — 99999 PR PBB SHADOW E&M-EST. PATIENT-LVL IV: ICD-10-PCS | Mod: PBBFAC,,, | Performed by: NURSE PRACTITIONER

## 2023-04-26 PROCEDURE — 99213 OFFICE O/P EST LOW 20 MIN: CPT | Mod: S$GLB,,, | Performed by: NURSE PRACTITIONER

## 2023-04-26 PROCEDURE — 1159F PR MEDICATION LIST DOCUMENTED IN MEDICAL RECORD: ICD-10-PCS | Mod: CPTII,S$GLB,, | Performed by: NURSE PRACTITIONER

## 2023-04-26 PROCEDURE — 3074F SYST BP LT 130 MM HG: CPT | Mod: CPTII,S$GLB,, | Performed by: NURSE PRACTITIONER

## 2023-04-26 RX ORDER — BACLOFEN 10 MG/1
10 TABLET ORAL 2 TIMES DAILY
Qty: 60 TABLET | Refills: 0 | Status: SHIPPED | OUTPATIENT
Start: 2023-04-26 | End: 2023-05-23

## 2023-04-26 NOTE — PROGRESS NOTES
Subjective:       Patient ID: Bennett Rubin is a 27 y.o. male.    Chief Complaint: Shoulder Pain    Pt presents to clinic today for left shoulder pain  Started on Monday while lifting something at work   Has been using otc pain cream, warm showers without relief in symptoms   When he lifts his shoulder above shoulder heigh it causes pain  Also when reaching behind  Cannot take tylenol due to liver issues  Doesn't feel like the pain is getting any better    /82   Pulse 80   Temp 97.3 °F (36.3 °C)   Wt 88 kg (194 lb 0.1 oz)   SpO2 99%   BMI 27.84 kg/m²     Review of Systems   Constitutional:  Negative for activity change, appetite change, chills, diaphoresis, fatigue, fever and unexpected weight change.   HENT: Negative.     Eyes: Negative.    Respiratory:  Negative for apnea, chest tightness, shortness of breath and stridor.    Cardiovascular:  Negative for chest pain, palpitations and leg swelling.   Gastrointestinal: Negative.    Endocrine: Negative.    Genitourinary: Negative.    Musculoskeletal:  Positive for arthralgias and myalgias.   Skin:  Negative for color change, pallor, rash and wound.   Allergic/Immunologic: Negative.    Neurological:  Negative for dizziness, facial asymmetry, light-headedness and headaches.   Hematological:  Negative for adenopathy.   Psychiatric/Behavioral:  Negative for agitation and behavioral problems.      Objective:      Physical Exam  Vitals and nursing note reviewed.   Constitutional:       General: He is not in acute distress.     Appearance: He is well-developed. He is not diaphoretic.   HENT:      Head: Normocephalic and atraumatic.   Cardiovascular:      Rate and Rhythm: Normal rate and regular rhythm.      Heart sounds: Normal heart sounds.   Pulmonary:      Effort: Pulmonary effort is normal. No respiratory distress.      Breath sounds: Normal breath sounds.   Musculoskeletal:      Left shoulder: Tenderness present. No swelling. Decreased range of motion. Decreased  strength.      Comments: Pain with ROM   Skin:     General: Skin is warm and dry.      Findings: No rash.   Neurological:      Mental Status: He is alert and oriented to person, place, and time.   Psychiatric:         Behavior: Behavior normal.         Thought Content: Thought content normal.         Judgment: Judgment normal.       Assessment:       1. Acute pain of left shoulder    2. BMI 27.0-27.9,adult        Plan:       Bennett was seen today for shoulder pain.    Diagnoses and all orders for this visit:    Acute pain of left shoulder  -     baclofen (LIORESAL) 10 MG tablet; Take 1 tablet (10 mg total) by mouth 2 (two) times daily.    BMI 27.0-27.9,adult      Offered xray but pt declined  Will see if it gets better over the next few days  Ortho and/or PT for worsening symptoms  Follow up for worsening or no improvement in symptoms and PRN.

## 2023-04-30 PROBLEM — R17 JAUNDICE: Status: ACTIVE | Noted: 2023-04-30

## 2023-04-30 NOTE — PROGRESS NOTES
Hepatology Clinic Note    Referring provider: No ref. provider found    Chief complaint:   Chief Complaint   Patient presents with    Hepatic Disease    Elevated Hepatic Enzymes       Clinical Summary: Bennett Rubin is a 27 y.o. 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 Patient regained strength and weight  No new symptoms from liver standpoint except   Liver enzymes improving slowly per last labs    2/23/23  Patient had ED visit for persistent symptoms  Most etiological labs work non diagnostic including MRI/MRCP    2/15/23   Patient was admitted [facility] 2/6/2023-2/7/2023 for evaluation of elevated liver enzymes Patient is here for second opinion. His labs today shows bilirubin 7 and elevated liver enzymes.     Patient mentions that he had visit  to [place] a month ago during which he started having stomach upset and vomiting later he noticed jaundice.  He was admitted for evaluation at  [facility].  He mentions all the workup including viral hepatitis panel and ultrasound were negative and since his liver tests improved he was discharged after hydration. Per patient no diagnosis reached. No records available for review.  Today  Patient has significant nausea. Also complains itching and severo Coloured stools. Denies  vomiting, hematemesis, melena, or hematochezia.  He denies any signs of liver cell failure.  No encephalopathy or bleeding tendencies.  Patient has been taking anabolic steroids and testosterone last 5-6 months he has been off those  over last 3-4 weeks. No alternative or herbal medications. Taking Benadryl and colestipol for itching as prescribed during last hospitalization.     Abdominal pain - no  Reflux - no  Dysphagia - no   Bowel habits - normal  GI bleeding - none  NSAID usage - none    Denies alcohol      History reviewed. No pertinent past medical history.  Past Surgical History:   Procedure Laterality Date    CYST  REMOVAL Left 2005    cyst removal under left nipple    RECONSTRUCTION OF FINGER Left     pinky finger     Family History   Problem Relation Age of Onset    Hypertension Father     Dementia Maternal Grandmother     Heart attack Paternal Grandmother     Heart attack Paternal Grandfather      Social History     Socioeconomic History    Marital status: Single   Tobacco Use    Smoking status: Former     Types: Vaping with nicotine    Smokeless tobacco: Never   Substance and Sexual Activity    Alcohol use: Not Currently    Drug use: Yes     Types: Anabolic steroids    Sexual activity: Yes     Partners: Female     Birth control/protection: Condom       Review of Systems:  14 point ROS negative except for above pertinent positives.    Review of Systems   Constitutional:  Negative for fever and weight loss.   HENT:  Negative for hearing loss and tinnitus.    Eyes:  Negative for double vision and photophobia.   Respiratory:  Negative for hemoptysis and wheezing.    Cardiovascular:  Negative for chest pain and palpitations.   Gastrointestinal:  Negative for blood in stool and melena.   Musculoskeletal:  Negative for falls.   Skin:  Negative for itching and rash.   Neurological:  Negative for focal weakness.   Endo/Heme/Allergies:  Negative for polydipsia.   Psychiatric/Behavioral:  Negative for hallucinations and suicidal ideas.          Objective:    Physical Exam:  Vitals:    05/02/23 0934   BP: 124/84   Pulse: 76           Physical Exam  Constitutional:       Appearance: Normal appearance.   HENT:      Head: Normocephalic and atraumatic.      Nose: No congestion.      Mouth/Throat:      Mouth: Mucous membranes are moist.   Eyes:      Pupils: Pupils are equal, round, and reactive to light.   Cardiovascular:      Heart sounds: No murmur heard.    No friction rub.   Pulmonary:      Effort: No respiratory distress.      Breath sounds: No stridor. No rhonchi.   Abdominal:      General: There is no distension.      Palpations:  There is no mass.      Tenderness: There is no abdominal tenderness.   Musculoskeletal:         General: No swelling or deformity.      Cervical back: No rigidity or tenderness.   Skin:     Coloration: Skin is not jaundiced.      Findings: No bruising.   Neurological:      Mental Status: He is alert.      Cranial Nerves: No cranial nerve deficit.      Motor: No weakness.   Psychiatric:         Mood and Affect: Mood normal.         Behavior: Behavior normal.         Pertinent Labs:     Lab Visit on 03/22/2023   Component Date Value Ref Range Status    WBC 03/22/2023 7.37  3.90 - 12.70 K/uL Final    RBC 03/22/2023 4.81  4.60 - 6.20 M/uL Final    Hemoglobin 03/22/2023 15.1  14.0 - 18.0 g/dL Final    Hematocrit 03/22/2023 43.8  40.0 - 54.0 % Final    MCV 03/22/2023 91  82 - 98 fL Final    MCH 03/22/2023 31.4 (H)  27.0 - 31.0 pg Final    MCHC 03/22/2023 34.5  32.0 - 36.0 g/dL Final    RDW 03/22/2023 13.8  11.5 - 14.5 % Final    Platelets 03/22/2023 365  150 - 450 K/uL Final    MPV 03/22/2023 11.2  9.2 - 12.9 fL Final    Immature Granulocytes 03/22/2023 0.7 (H)  0.0 - 0.5 % Final    Gran # (ANC) 03/22/2023 4.7  1.8 - 7.7 K/uL Final    Immature Grans (Abs) 03/22/2023 0.05 (H)  0.00 - 0.04 K/uL Final    Lymph # 03/22/2023 1.7  1.0 - 4.8 K/uL Final    Mono # 03/22/2023 0.8  0.3 - 1.0 K/uL Final    Eos # 03/22/2023 0.1  0.0 - 0.5 K/uL Final    Baso # 03/22/2023 0.06  0.00 - 0.20 K/uL Final    nRBC 03/22/2023 0  0 /100 WBC Final    Gran % 03/22/2023 63.2  38.0 - 73.0 % Final    Lymph % 03/22/2023 22.9  18.0 - 48.0 % Final    Mono % 03/22/2023 11.3  4.0 - 15.0 % Final    Eosinophil % 03/22/2023 1.1  0.0 - 8.0 % Final    Basophil % 03/22/2023 0.8  0.0 - 1.9 % Final    Differential Method 03/22/2023 Automated   Final    Sodium 03/22/2023 138  136 - 145 mmol/L Final    Potassium 03/22/2023 4.3  3.5 - 5.1 mmol/L Final    Chloride 03/22/2023 105  95 - 110 mmol/L Final    CO2 03/22/2023 22 (L)  23 - 29 mmol/L Final    Glucose  03/22/2023 97  70 - 110 mg/dL Final    BUN 03/22/2023 19  6 - 20 mg/dL Final    Creatinine 03/22/2023 1.1  0.5 - 1.4 mg/dL Final    Calcium 03/22/2023 10.1  8.7 - 10.5 mg/dL Final    Total Protein 03/22/2023 7.7  6.0 - 8.4 g/dL Final    Albumin 03/22/2023 3.6  3.5 - 5.2 g/dL Final    Total Bilirubin 03/22/2023 2.6 (H)  0.1 - 1.0 mg/dL Final    Alkaline Phosphatase 03/22/2023 118  55 - 135 U/L Final    AST 03/22/2023 61 (H)  10 - 40 U/L Final    ALT 03/22/2023 76 (H)  10 - 44 U/L Final    Anion Gap 03/22/2023 11  8 - 16 mmol/L Final    eGFR 03/22/2023 >60.0  >60 mL/min/1.73 m^2 Final    Prothrombin Time 03/22/2023 9.8  9.0 - 12.5 sec Final    INR 03/22/2023 0.9  0.8 - 1.2 Final   Lab Visit on 03/13/2023   Component Date Value Ref Range Status    WBC 03/13/2023 6.69  3.90 - 12.70 K/uL Final    RBC 03/13/2023 4.89  4.60 - 6.20 M/uL Final    Hemoglobin 03/13/2023 15.4  14.0 - 18.0 g/dL Final    Hematocrit 03/13/2023 46.2  40.0 - 54.0 % Final    MCV 03/13/2023 95  82 - 98 fL Final    MCH 03/13/2023 31.5 (H)  27.0 - 31.0 pg Final    MCHC 03/13/2023 33.3  32.0 - 36.0 g/dL Final    RDW 03/13/2023 14.2  11.5 - 14.5 % Final    Platelets 03/13/2023 341  150 - 450 K/uL Final    MPV 03/13/2023 11.7  9.2 - 12.9 fL Final    Immature Granulocytes 03/13/2023 1.3 (H)  0.0 - 0.5 % Final    Gran # (ANC) 03/13/2023 4.0  1.8 - 7.7 K/uL Final    Immature Grans (Abs) 03/13/2023 0.09 (H)  0.00 - 0.04 K/uL Final    Lymph # 03/13/2023 1.7  1.0 - 4.8 K/uL Final    Mono # 03/13/2023 0.8  0.3 - 1.0 K/uL Final    Eos # 03/13/2023 0.1  0.0 - 0.5 K/uL Final    Baso # 03/13/2023 0.10  0.00 - 0.20 K/uL Final    nRBC 03/13/2023 0  0 /100 WBC Final    Gran % 03/13/2023 59.1  38.0 - 73.0 % Final    Lymph % 03/13/2023 24.8  18.0 - 48.0 % Final    Mono % 03/13/2023 11.4  4.0 - 15.0 % Final    Eosinophil % 03/13/2023 1.9  0.0 - 8.0 % Final    Basophil % 03/13/2023 1.5  0.0 - 1.9 % Final    Differential Method 03/13/2023 Automated   Final    Sodium  03/13/2023 138  136 - 145 mmol/L Final    Potassium 03/13/2023 4.6  3.5 - 5.1 mmol/L Final    Chloride 03/13/2023 102  95 - 110 mmol/L Final    CO2 03/13/2023 27  23 - 29 mmol/L Final    Glucose 03/13/2023 94  70 - 110 mg/dL Final    BUN 03/13/2023 16  6 - 20 mg/dL Final    Creatinine 03/13/2023 1.3  0.5 - 1.4 mg/dL Final    Calcium 03/13/2023 9.9  8.7 - 10.5 mg/dL Final    Total Protein 03/13/2023 8.0  6.0 - 8.4 g/dL Final    Albumin 03/13/2023 3.7  3.5 - 5.2 g/dL Final    Total Bilirubin 03/13/2023 4.3 (H)  0.1 - 1.0 mg/dL Final    Alkaline Phosphatase 03/13/2023 134  55 - 135 U/L Final    AST 03/13/2023 51 (H)  10 - 40 U/L Final    ALT 03/13/2023 70 (H)  10 - 44 U/L Final    Anion Gap 03/13/2023 9  8 - 16 mmol/L Final    eGFR 03/13/2023 >60.0  >60 mL/min/1.73 m^2 Final    Prothrombin Time 03/13/2023 9.9  9.0 - 12.5 sec Final    INR 03/13/2023 0.9  0.8 - 1.2 Final   Lab Visit on 02/27/2023   Component Date Value Ref Range Status    WBC 02/27/2023 10.15  3.90 - 12.70 K/uL Final    RBC 02/27/2023 4.57 (L)  4.60 - 6.20 M/uL Final    Hemoglobin 02/27/2023 14.3  14.0 - 18.0 g/dL Final    Hematocrit 02/27/2023 43.2  40.0 - 54.0 % Final    MCV 02/27/2023 95  82 - 98 fL Final    MCH 02/27/2023 31.3 (H)  27.0 - 31.0 pg Final    MCHC 02/27/2023 33.1  32.0 - 36.0 g/dL Final    RDW 02/27/2023 14.6 (H)  11.5 - 14.5 % Final    Platelets 02/27/2023 367  150 - 450 K/uL Final    MPV 02/27/2023 12.7  9.2 - 12.9 fL Final    Immature Granulocytes 02/27/2023 1.2 (H)  0.0 - 0.5 % Final    Gran # (ANC) 02/27/2023 6.9  1.8 - 7.7 K/uL Final    Immature Grans (Abs) 02/27/2023 0.12 (H)  0.00 - 0.04 K/uL Final    Lymph # 02/27/2023 1.8  1.0 - 4.8 K/uL Final    Mono # 02/27/2023 1.1 (H)  0.3 - 1.0 K/uL Final    Eos # 02/27/2023 0.2  0.0 - 0.5 K/uL Final    Baso # 02/27/2023 0.06  0.00 - 0.20 K/uL Final    nRBC 02/27/2023 0  0 /100 WBC Final    Gran % 02/27/2023 68.1  38.0 - 73.0 % Final    Lymph % 02/27/2023 18.1  18.0 - 48.0 % Final    Mono  % 02/27/2023 10.5  4.0 - 15.0 % Final    Eosinophil % 02/27/2023 1.5  0.0 - 8.0 % Final    Basophil % 02/27/2023 0.6  0.0 - 1.9 % Final    Differential Method 02/27/2023 Automated   Final    Sodium 02/27/2023 134 (L)  136 - 145 mmol/L Final    Potassium 02/27/2023 4.0  3.5 - 5.1 mmol/L Final    Chloride 02/27/2023 100  95 - 110 mmol/L Final    CO2 02/27/2023 24  23 - 29 mmol/L Final    Glucose 02/27/2023 98  70 - 110 mg/dL Final    BUN 02/27/2023 13  6 - 20 mg/dL Final    Creatinine 02/27/2023 1.2  0.5 - 1.4 mg/dL Final    Calcium 02/27/2023 9.3  8.7 - 10.5 mg/dL Final    Total Protein 02/27/2023 7.2  6.0 - 8.4 g/dL Final    Albumin 02/27/2023 3.3 (L)  3.5 - 5.2 g/dL Final    Total Bilirubin 02/27/2023 5.7 (H)  0.1 - 1.0 mg/dL Final    Alkaline Phosphatase 02/27/2023 117  55 - 135 U/L Final    AST 02/27/2023 50 (H)  10 - 40 U/L Final    ALT 02/27/2023 85 (H)  10 - 44 U/L Final    Anion Gap 02/27/2023 10  8 - 16 mmol/L Final    eGFR 02/27/2023 >60.0  >60 mL/min/1.73 m^2 Final    Prothrombin Time 02/27/2023 9.8  9.0 - 12.5 sec Final    INR 02/27/2023 0.9  0.8 - 1.2 Final   Lab Visit on 02/21/2023   Component Date Value Ref Range Status    HSV Ab, IgM by EIA 02/21/2023 0.36  <=0.90 INDEX Final    Monospot 02/21/2023 Negative  Negative Final    Bilirubin, Direct 02/21/2023 5.1 (H)  0.1 - 0.3 mg/dL Final    Haptoglobin 02/21/2023 113  30 - 250 mg/dL Final   Admission on 02/20/2023, Discharged on 02/20/2023   Component Date Value Ref Range Status    WBC 02/20/2023 10.09  3.90 - 12.70 K/uL Final    RBC 02/20/2023 4.52 (L)  4.60 - 6.20 M/uL Final    Hemoglobin 02/20/2023 14.2  14.0 - 18.0 g/dL Final    Hematocrit 02/20/2023 40.0  40.0 - 54.0 % Final    MCV 02/20/2023 89  82 - 98 fL Final    MCH 02/20/2023 31.4 (H)  27.0 - 31.0 pg Final    MCHC 02/20/2023 35.5  32.0 - 36.0 g/dL Final    RDW 02/20/2023 15.1 (H)  11.5 - 14.5 % Final    Platelets 02/20/2023 296  150 - 450 K/uL Final    MPV 02/20/2023 10.7  9.2 - 12.9 fL Final     Immature Granulocytes 02/20/2023 0.9 (H)  0.0 - 0.5 % Final    Gran # (ANC) 02/20/2023 7.1  1.8 - 7.7 K/uL Final    Immature Grans (Abs) 02/20/2023 0.09 (H)  0.00 - 0.04 K/uL Final    Lymph # 02/20/2023 1.7  1.0 - 4.8 K/uL Final    Mono # 02/20/2023 1.1 (H)  0.3 - 1.0 K/uL Final    Eos # 02/20/2023 0.1  0.0 - 0.5 K/uL Final    Baso # 02/20/2023 0.06  0.00 - 0.20 K/uL Final    nRBC 02/20/2023 0  0 /100 WBC Final    Gran % 02/20/2023 69.8  38.0 - 73.0 % Final    Lymph % 02/20/2023 16.4 (L)  18.0 - 48.0 % Final    Mono % 02/20/2023 10.9  4.0 - 15.0 % Final    Eosinophil % 02/20/2023 1.4  0.0 - 8.0 % Final    Basophil % 02/20/2023 0.6  0.0 - 1.9 % Final    Differential Method 02/20/2023 Automated   Final    Sodium 02/20/2023 135 (L)  136 - 145 mmol/L Final    Potassium 02/20/2023 3.6  3.5 - 5.1 mmol/L Final    Chloride 02/20/2023 103  95 - 110 mmol/L Final    CO2 02/20/2023 21 (L)  23 - 29 mmol/L Final    Glucose 02/20/2023 130 (H)  70 - 110 mg/dL Final    BUN 02/20/2023 13  6 - 20 mg/dL Final    Creatinine 02/20/2023 1.3  0.5 - 1.4 mg/dL Final    Calcium 02/20/2023 8.6 (L)  8.7 - 10.5 mg/dL Final    Total Protein 02/20/2023 7.0  6.0 - 8.4 g/dL Final    Albumin 02/20/2023 3.1 (L)  3.5 - 5.2 g/dL Final    Total Bilirubin 02/20/2023 6.0 (H)  0.1 - 1.0 mg/dL Final    Alkaline Phosphatase 02/20/2023 134  55 - 135 U/L Final    AST 02/20/2023 44 (H)  10 - 40 U/L Final    ALT 02/20/2023 56 (H)  10 - 44 U/L Final    Anion Gap 02/20/2023 11  8 - 16 mmol/L Final    eGFR 02/20/2023 >60  >60 mL/min/1.73 m^2 Final    Lipase 02/20/2023 50  4 - 60 U/L Final    Specimen UA 02/20/2023 Urine, Clean Catch   Final    Color, UA 02/20/2023 Yellow  Yellow, Straw, Angle Final    Appearance, UA 02/20/2023 Clear  Clear Final    pH, UA 02/20/2023 6.0  5.0 - 8.0 Final    Specific Gravity, UA 02/20/2023 1.030  1.005 - 1.030 Final    Protein, UA 02/20/2023 Trace (A)  Negative Final    Glucose, UA 02/20/2023 Negative  Negative Final    Ketones, UA  02/20/2023 Negative  Negative Final    Bilirubin (UA) 02/20/2023 2+ (A)  Negative Final    Occult Blood UA 02/20/2023 Negative  Negative Final    Nitrite, UA 02/20/2023 Negative  Negative Final    Urobilinogen, UA 02/20/2023 2.0-3.0 (A)  <2.0 EU/dL Final    Leukocytes, UA 02/20/2023 Negative  Negative Final    Ammonia 02/20/2023 47  10 - 50 umol/L Final    Prothrombin Time 02/20/2023 10.0  9.0 - 12.5 sec Final    INR 02/20/2023 0.9  0.8 - 1.2 Final    CPK 02/20/2023 267 (H)  20 - 200 U/L Final   Lab Visit on 02/15/2023   Component Date Value Ref Range Status    WBC 02/15/2023 8.91  3.90 - 12.70 K/uL Final    RBC 02/15/2023 5.16  4.60 - 6.20 M/uL Final    Hemoglobin 02/15/2023 15.6  14.0 - 18.0 g/dL Final    Hematocrit 02/15/2023 47.4  40.0 - 54.0 % Final    MCV 02/15/2023 92  82 - 98 fL Final    MCH 02/15/2023 30.2  27.0 - 31.0 pg Final    MCHC 02/15/2023 32.9  32.0 - 36.0 g/dL Final    RDW 02/15/2023 15.8 (H)  11.5 - 14.5 % Final    Platelets 02/15/2023 345  150 - 450 K/uL Final    MPV 02/15/2023 12.2  9.2 - 12.9 fL Final    Immature Granulocytes 02/15/2023 1.2 (H)  0.0 - 0.5 % Final    Gran # (ANC) 02/15/2023 6.1  1.8 - 7.7 K/uL Final    Immature Grans (Abs) 02/15/2023 0.11 (H)  0.00 - 0.04 K/uL Final    Lymph # 02/15/2023 1.8  1.0 - 4.8 K/uL Final    Mono # 02/15/2023 0.8  0.3 - 1.0 K/uL Final    Eos # 02/15/2023 0.1  0.0 - 0.5 K/uL Final    Baso # 02/15/2023 0.06  0.00 - 0.20 K/uL Final    nRBC 02/15/2023 0  0 /100 WBC Final    Gran % 02/15/2023 68.0  38.0 - 73.0 % Final    Lymph % 02/15/2023 19.9  18.0 - 48.0 % Final    Mono % 02/15/2023 9.1  4.0 - 15.0 % Final    Eosinophil % 02/15/2023 1.1  0.0 - 8.0 % Final    Basophil % 02/15/2023 0.7  0.0 - 1.9 % Final    Differential Method 02/15/2023 Automated   Final    Sodium 02/15/2023 134 (L)  136 - 145 mmol/L Final    Potassium 02/15/2023 3.6  3.5 - 5.1 mmol/L Final    Chloride 02/15/2023 100  95 - 110 mmol/L Final    CO2 02/15/2023 23  23 - 29 mmol/L Final     Glucose 02/15/2023 94  70 - 110 mg/dL Final    BUN 02/15/2023 17  6 - 20 mg/dL Final    Creatinine 02/15/2023 1.2  0.5 - 1.4 mg/dL Final    Calcium 02/15/2023 9.4  8.7 - 10.5 mg/dL Final    Total Protein 02/15/2023 7.3  6.0 - 8.4 g/dL Final    Albumin 02/15/2023 3.4 (L)  3.5 - 5.2 g/dL Final    Total Bilirubin 02/15/2023 6.7 (H)  0.1 - 1.0 mg/dL Final    Alkaline Phosphatase 02/15/2023 119  55 - 135 U/L Final    AST 02/15/2023 48 (H)  10 - 40 U/L Final    ALT 02/15/2023 71 (H)  10 - 44 U/L Final    Anion Gap 02/15/2023 11  8 - 16 mmol/L Final    eGFR 02/15/2023 >60.0  >60 mL/min/1.73 m^2 Final    Prothrombin Time 02/15/2023 9.8  9.0 - 12.5 sec Final    INR 02/15/2023 0.9  0.8 - 1.2 Final    PEth 16:0/18.1 (POPEth) 02/15/2023 <10  Cutoff: 10 ng/mL Final    PEth 16:0/18.2 (PLPEth) 02/15/2023 <10  Cutoff: 10 ng/mL Final    PETH INTERPRETATION 02/15/2023 Negative.   Final    AFP 02/15/2023 <2.0  0.0 - 8.4 ng/mL Final    RAQUEL Screen 02/15/2023 Negative <1:80  Negative <1:80 Final    A-1 Antitrypsin 02/15/2023 190  100 - 190 mg/dL Final    Anti-Mitochon Ab IFA 02/15/2023 Negative 1:40  Negative Final    Smooth Muscle Ab 02/15/2023 Negative 1:40  Negative Final    Ceruloplasmin 02/15/2023 48.0 (H)  15.0 - 45.0 mg/dL Final    A-1 Antitrypsin 02/15/2023 190  100 - 190 mg/dL Final    Acetaminophen (Tylenol), Serum 02/15/2023 <3.0 (L)  10.0 - 20.0 ug/mL Final    Anti-Liver/Kidney Microsome Ab 02/15/2023 1.8  <=20 UNITS Final    Anti-Mitochon Ab IFA 02/15/2023 Negative 1:40  Negative Final    Smooth Muscle Ab 02/15/2023 Negative 1:40  Negative Final    Antigliadin Abs, IgA 02/15/2023 1.1  <7.0 U/mL Final    Antigliadin Ab IgG 02/15/2023 1.8  <7.0 U/mL Final    TTG IgA 02/15/2023 1.4  <7.0 U/mL Final    TTG IgG 02/15/2023 0.9  <7.0 U/mL Final    Immunoglobulin A (IgA) 02/15/2023 358  70 - 400 mg/dL Final    Ceruloplasmin 02/15/2023 48.0 (H)  15.0 - 45.0 mg/dL Final    Cytomegalovirus PCR, Quant 02/15/2023 Not Detected  <50 IU/mL  Final    Cytomegalovirus DNA 02/15/2023 Not Detected  Not Detected Final    Cytomegalovirus Log (IU/mL) 02/15/2023 Not Detected  <1.70 LogIU/mL Final    Hepatitis E IgM Antibody 02/15/2023 NOT DETECTED   Final    Delta Ab Total Quantative 02/15/2023 Negative   Final    Hep B C IgM 02/15/2023 Non-reactive  Non-reactive Final    IgG 02/15/2023 1209  650 - 1600 mg/dL Final    HSV Ab, IgM by EIA 02/15/2023 0.12  <=0.90 INDEX Final    Hepatitis B Surface Ag 02/15/2023 Non-reactive  Non-reactive Final    Hep B C IgM 02/15/2023 Non-reactive  Non-reactive Final    Hep A IgM 02/15/2023 Non-reactive  Non-reactive Final    Hepatitis C Ab 02/15/2023 Non-reactive  Non-reactive Final    TSH 02/15/2023 1.107  0.400 - 4.000 uIU/mL Final    PEth 16:0/18.1 (POPEth) 02/15/2023 <10  Cutoff: 10 ng/mL Final    PEth 16:0/18.2 (PLPEth) 02/15/2023 <10  Cutoff: 10 ng/mL Final    PETH INTERPRETATION 02/15/2023 Negative.   Final   Office Visit on 02/14/2023   Component Date Value Ref Range Status    Color, UA 02/14/2023 Angle   Final    pH, UA 02/14/2023 5   Final    WBC, UA 02/14/2023 neg   Final    Nitrite, UA 02/14/2023 neg   Final    Protein, POC 02/14/2023 neg   Final    Glucose, UA 02/14/2023 nor   Final    Ketones, UA 02/14/2023 neg   Final    Urobilinogen, UA 02/14/2023 nor   Final    Bilirubin, POC 02/14/2023 ++   Final    Blood, UA 02/14/2023 neg   Final    Clarity, UA 02/14/2023 Clear   Final    Spec Grav UA 02/14/2023 1.020   Final   Lab Visit on 02/14/2023   Component Date Value Ref Range Status    Total Protein 02/14/2023 7.5  6.0 - 8.4 g/dL Final    Albumin 02/14/2023 3.4 (L)  3.5 - 5.2 g/dL Final    Total Bilirubin 02/14/2023 6.9 (H)  0.1 - 1.0 mg/dL Final    Bilirubin, Direct 02/14/2023 5.0 (H)  0.1 - 0.3 mg/dL Final    AST 02/14/2023 51 (H)  10 - 40 U/L Final    ALT 02/14/2023 78 (H)  10 - 44 U/L Final    Alkaline Phosphatase 02/14/2023 119  55 - 135 U/L Final    Sodium 02/14/2023 136  136 - 145 mmol/L Final    Potassium  02/14/2023 4.0  3.5 - 5.1 mmol/L Final    Chloride 02/14/2023 101  95 - 110 mmol/L Final    CO2 02/14/2023 25  23 - 29 mmol/L Final    Glucose 02/14/2023 94  70 - 110 mg/dL Final    BUN 02/14/2023 15  6 - 20 mg/dL Final    Creatinine 02/14/2023 1.3  0.5 - 1.4 mg/dL Final    Calcium 02/14/2023 9.3  8.7 - 10.5 mg/dL Final    Total Protein 02/14/2023 7.5  6.0 - 8.4 g/dL Final    Albumin 02/14/2023 3.4 (L)  3.5 - 5.2 g/dL Final    Total Bilirubin 02/14/2023 6.9 (H)  0.1 - 1.0 mg/dL Final    Alkaline Phosphatase 02/14/2023 119  55 - 135 U/L Final    AST 02/14/2023 51 (H)  10 - 40 U/L Final    ALT 02/14/2023 78 (H)  10 - 44 U/L Final    Anion Gap 02/14/2023 10  8 - 16 mmol/L Final    eGFR 02/14/2023 >60.0  >60 mL/min/1.73 m^2 Final    CPK 02/14/2023 343 (H)  20 - 200 U/L Final        Imaging:  No images are attached to the encounter.    Assessment:  Problem List Items Addressed This Visit          GI    Subacute liver failure without hepatic coma    Elevated liver enzymes    Relevant Orders    CBC Auto Differential    Protime-INR    Comprehensive Metabolic Panel    CBC Auto Differential    Comprehensive Metabolic Panel    Protime-INR    US Doppler Abdomen Complete    Drug-induced liver injury - Primary    Jaundice           Bennett was seen today for hepatic disease and elevated hepatic enzymes.    Diagnoses and all orders for this visit:    Drug-induced liver injury    Elevated liver enzymes  -     CBC Auto Differential; Future  -     Protime-INR; Future  -     Comprehensive Metabolic Panel; Future  -     CBC Auto Differential; Standing  -     Comprehensive Metabolic Panel; Standing  -     Protime-INR; Standing  -     US Doppler Abdomen Complete; Future    Subacute liver failure without hepatic coma    Jaundice        Subacute Liver failure  -Resolving  -No coaguloapthy      Drug induced liver injury  Labs today  Monitor Labs and US in 6 month  Resolving clinically  -Avoid all Hepatotoxic medication  -Close monitoring for  signs of Acute liver failure        Follow up in about 6 months (around 11/2/2023).      Time Statement  A total Time Includes preparing to see patient, reviewing  diagnostic studies and records, direct face-to-face visit, completing orders, medications , reconciliation, prescription management, and care coordination    We discussed in depth the nature of the patient's disease, the management plan in details. I have provided the patient with an opportunity to ask questions and have all questions answered to his satisfaction.       JACINTO FORD MD  Gastroenterology & Transplant Hepatology  [facility] [place] [facility] [place]

## 2023-05-02 ENCOUNTER — OFFICE VISIT (OUTPATIENT)
Dept: HEPATOLOGY | Facility: CLINIC | Age: 28
End: 2023-05-02
Payer: COMMERCIAL

## 2023-05-02 ENCOUNTER — LAB VISIT (OUTPATIENT)
Dept: LAB | Facility: HOSPITAL | Age: 28
End: 2023-05-02
Attending: INTERNAL MEDICINE
Payer: COMMERCIAL

## 2023-05-02 VITALS
HEART RATE: 76 BPM | HEIGHT: 70 IN | SYSTOLIC BLOOD PRESSURE: 124 MMHG | WEIGHT: 198.19 LBS | DIASTOLIC BLOOD PRESSURE: 84 MMHG | BODY MASS INDEX: 28.37 KG/M2

## 2023-05-02 DIAGNOSIS — R74.8 ELEVATED LIVER ENZYMES: ICD-10-CM

## 2023-05-02 DIAGNOSIS — K71.9 DRUG-INDUCED LIVER INJURY: Primary | ICD-10-CM

## 2023-05-02 DIAGNOSIS — K72.00 SUBACUTE LIVER FAILURE WITHOUT HEPATIC COMA: ICD-10-CM

## 2023-05-02 DIAGNOSIS — R17 JAUNDICE: ICD-10-CM

## 2023-05-02 LAB
ALBUMIN SERPL BCP-MCNC: 4.3 G/DL (ref 3.5–5.2)
ALP SERPL-CCNC: 80 U/L (ref 55–135)
ALT SERPL W/O P-5'-P-CCNC: 88 U/L (ref 10–44)
ANION GAP SERPL CALC-SCNC: 9 MMOL/L (ref 8–16)
AST SERPL-CCNC: 46 U/L (ref 10–40)
BASOPHILS # BLD AUTO: 0.03 K/UL (ref 0–0.2)
BASOPHILS NFR BLD: 0.4 % (ref 0–1.9)
BILIRUB SERPL-MCNC: 0.8 MG/DL (ref 0.1–1)
BUN SERPL-MCNC: 13 MG/DL (ref 6–20)
CALCIUM SERPL-MCNC: 10 MG/DL (ref 8.7–10.5)
CHLORIDE SERPL-SCNC: 99 MMOL/L (ref 95–110)
CO2 SERPL-SCNC: 29 MMOL/L (ref 23–29)
CREAT SERPL-MCNC: 0.9 MG/DL (ref 0.5–1.4)
DIFFERENTIAL METHOD: NORMAL
EOSINOPHIL # BLD AUTO: 0.1 K/UL (ref 0–0.5)
EOSINOPHIL NFR BLD: 0.7 % (ref 0–8)
ERYTHROCYTE [DISTWIDTH] IN BLOOD BY AUTOMATED COUNT: 12.4 % (ref 11.5–14.5)
EST. GFR  (NO RACE VARIABLE): >60 ML/MIN/1.73 M^2
GLUCOSE SERPL-MCNC: 89 MG/DL (ref 70–110)
HCT VFR BLD AUTO: 50.7 % (ref 40–54)
HGB BLD-MCNC: 17 G/DL (ref 14–18)
IMM GRANULOCYTES # BLD AUTO: 0.03 K/UL (ref 0–0.04)
IMM GRANULOCYTES NFR BLD AUTO: 0.4 % (ref 0–0.5)
INR PPP: 1 (ref 0.8–1.2)
LYMPHOCYTES # BLD AUTO: 1.6 K/UL (ref 1–4.8)
LYMPHOCYTES NFR BLD: 21.9 % (ref 18–48)
MCH RBC QN AUTO: 31 PG (ref 27–31)
MCHC RBC AUTO-ENTMCNC: 33.5 G/DL (ref 32–36)
MCV RBC AUTO: 93 FL (ref 82–98)
MONOCYTES # BLD AUTO: 0.7 K/UL (ref 0.3–1)
MONOCYTES NFR BLD: 9.8 % (ref 4–15)
NEUTROPHILS # BLD AUTO: 5 K/UL (ref 1.8–7.7)
NEUTROPHILS NFR BLD: 66.8 % (ref 38–73)
NRBC BLD-RTO: 0 /100 WBC
PLATELET # BLD AUTO: 286 K/UL (ref 150–450)
PMV BLD AUTO: 10.7 FL (ref 9.2–12.9)
POTASSIUM SERPL-SCNC: 4.3 MMOL/L (ref 3.5–5.1)
PROT SERPL-MCNC: 8.1 G/DL (ref 6–8.4)
PROTHROMBIN TIME: 10.4 SEC (ref 9–12.5)
RBC # BLD AUTO: 5.48 M/UL (ref 4.6–6.2)
SODIUM SERPL-SCNC: 137 MMOL/L (ref 136–145)
WBC # BLD AUTO: 7.45 K/UL (ref 3.9–12.7)

## 2023-05-02 PROCEDURE — 3079F PR MOST RECENT DIASTOLIC BLOOD PRESSURE 80-89 MM HG: ICD-10-PCS | Mod: CPTII,S$GLB,, | Performed by: INTERNAL MEDICINE

## 2023-05-02 PROCEDURE — 99999 PR PBB SHADOW E&M-EST. PATIENT-LVL III: CPT | Mod: PBBFAC,,, | Performed by: INTERNAL MEDICINE

## 2023-05-02 PROCEDURE — 99999 PR PBB SHADOW E&M-EST. PATIENT-LVL III: ICD-10-PCS | Mod: PBBFAC,,, | Performed by: INTERNAL MEDICINE

## 2023-05-02 PROCEDURE — 99215 PR OFFICE/OUTPT VISIT, EST, LEVL V, 40-54 MIN: ICD-10-PCS | Mod: S$GLB,,, | Performed by: INTERNAL MEDICINE

## 2023-05-02 PROCEDURE — 3008F PR BODY MASS INDEX (BMI) DOCUMENTED: ICD-10-PCS | Mod: CPTII,S$GLB,, | Performed by: INTERNAL MEDICINE

## 2023-05-02 PROCEDURE — 85610 PROTHROMBIN TIME: CPT | Performed by: INTERNAL MEDICINE

## 2023-05-02 PROCEDURE — 85025 COMPLETE CBC W/AUTO DIFF WBC: CPT | Performed by: INTERNAL MEDICINE

## 2023-05-02 PROCEDURE — 80053 COMPREHEN METABOLIC PANEL: CPT | Performed by: INTERNAL MEDICINE

## 2023-05-02 PROCEDURE — 99215 OFFICE O/P EST HI 40 MIN: CPT | Mod: S$GLB,,, | Performed by: INTERNAL MEDICINE

## 2023-05-02 PROCEDURE — 36415 COLL VENOUS BLD VENIPUNCTURE: CPT | Performed by: INTERNAL MEDICINE

## 2023-05-02 PROCEDURE — 3079F DIAST BP 80-89 MM HG: CPT | Mod: CPTII,S$GLB,, | Performed by: INTERNAL MEDICINE

## 2023-05-02 PROCEDURE — 3008F BODY MASS INDEX DOCD: CPT | Mod: CPTII,S$GLB,, | Performed by: INTERNAL MEDICINE

## 2023-05-02 PROCEDURE — 3074F SYST BP LT 130 MM HG: CPT | Mod: CPTII,S$GLB,, | Performed by: INTERNAL MEDICINE

## 2023-05-02 PROCEDURE — 3074F PR MOST RECENT SYSTOLIC BLOOD PRESSURE < 130 MM HG: ICD-10-PCS | Mod: CPTII,S$GLB,, | Performed by: INTERNAL MEDICINE

## 2023-05-02 PROCEDURE — 1159F MED LIST DOCD IN RCRD: CPT | Mod: CPTII,S$GLB,, | Performed by: INTERNAL MEDICINE

## 2023-05-02 PROCEDURE — 1159F PR MEDICATION LIST DOCUMENTED IN MEDICAL RECORD: ICD-10-PCS | Mod: CPTII,S$GLB,, | Performed by: INTERNAL MEDICINE

## 2023-05-22 ENCOUNTER — PATIENT MESSAGE (OUTPATIENT)
Dept: INTERNAL MEDICINE | Facility: CLINIC | Age: 28
End: 2023-05-22
Payer: COMMERCIAL

## 2023-05-23 ENCOUNTER — OFFICE VISIT (OUTPATIENT)
Dept: INTERNAL MEDICINE | Facility: CLINIC | Age: 28
End: 2023-05-23
Payer: COMMERCIAL

## 2023-05-23 ENCOUNTER — HOSPITAL ENCOUNTER (OUTPATIENT)
Dept: RADIOLOGY | Facility: HOSPITAL | Age: 28
Discharge: HOME | End: 2023-05-23
Attending: FAMILY MEDICINE
Payer: COMMERCIAL

## 2023-05-23 VITALS
SYSTOLIC BLOOD PRESSURE: 130 MMHG | HEART RATE: 87 BPM | HEIGHT: 70 IN | TEMPERATURE: 98 F | DIASTOLIC BLOOD PRESSURE: 88 MMHG | BODY MASS INDEX: 28.44 KG/M2 | WEIGHT: 198.63 LBS

## 2023-05-23 DIAGNOSIS — M25.512 ACUTE PAIN OF LEFT SHOULDER: ICD-10-CM

## 2023-05-23 DIAGNOSIS — K71.9 DRUG-INDUCED LIVER INJURY: ICD-10-CM

## 2023-05-23 DIAGNOSIS — M25.512 ACUTE PAIN OF LEFT SHOULDER: Primary | ICD-10-CM

## 2023-05-23 PROCEDURE — 3008F PR BODY MASS INDEX (BMI) DOCUMENTED: ICD-10-PCS | Mod: CPTII,S$GLB,, | Performed by: FAMILY MEDICINE

## 2023-05-23 PROCEDURE — 1159F MED LIST DOCD IN RCRD: CPT | Mod: CPTII,S$GLB,, | Performed by: FAMILY MEDICINE

## 2023-05-23 PROCEDURE — 99999 PR PBB SHADOW E&M-EST. PATIENT-LVL III: ICD-10-PCS | Mod: PBBFAC,,, | Performed by: FAMILY MEDICINE

## 2023-05-23 PROCEDURE — 1159F PR MEDICATION LIST DOCUMENTED IN MEDICAL RECORD: ICD-10-PCS | Mod: CPTII,S$GLB,, | Performed by: FAMILY MEDICINE

## 2023-05-23 PROCEDURE — 3075F PR MOST RECENT SYSTOLIC BLOOD PRESS GE 130-139MM HG: ICD-10-PCS | Mod: CPTII,S$GLB,, | Performed by: FAMILY MEDICINE

## 2023-05-23 PROCEDURE — 73030 XR SHOULDER COMPLETE 2 OR MORE VIEWS LEFT: ICD-10-PCS | Mod: 26,LT,, | Performed by: RADIOLOGY

## 2023-05-23 PROCEDURE — 73030 X-RAY EXAM OF SHOULDER: CPT | Mod: TC,FY,PO,LT

## 2023-05-23 PROCEDURE — 3079F DIAST BP 80-89 MM HG: CPT | Mod: CPTII,S$GLB,, | Performed by: FAMILY MEDICINE

## 2023-05-23 PROCEDURE — 3079F PR MOST RECENT DIASTOLIC BLOOD PRESSURE 80-89 MM HG: ICD-10-PCS | Mod: CPTII,S$GLB,, | Performed by: FAMILY MEDICINE

## 2023-05-23 PROCEDURE — 3075F SYST BP GE 130 - 139MM HG: CPT | Mod: CPTII,S$GLB,, | Performed by: FAMILY MEDICINE

## 2023-05-23 PROCEDURE — 99214 PR OFFICE/OUTPT VISIT, EST, LEVL IV, 30-39 MIN: ICD-10-PCS | Mod: S$GLB,,, | Performed by: FAMILY MEDICINE

## 2023-05-23 PROCEDURE — 73030 X-RAY EXAM OF SHOULDER: CPT | Mod: 26,LT,, | Performed by: RADIOLOGY

## 2023-05-23 PROCEDURE — 99999 PR PBB SHADOW E&M-EST. PATIENT-LVL III: CPT | Mod: PBBFAC,,, | Performed by: FAMILY MEDICINE

## 2023-05-23 PROCEDURE — 99214 OFFICE O/P EST MOD 30 MIN: CPT | Mod: S$GLB,,, | Performed by: FAMILY MEDICINE

## 2023-05-23 PROCEDURE — 3008F BODY MASS INDEX DOCD: CPT | Mod: CPTII,S$GLB,, | Performed by: FAMILY MEDICINE

## 2023-05-23 NOTE — PROGRESS NOTES
Subjective:       Patient ID: Bennett Rubin is a 27 y.o. male.    Chief Complaint: Shoulder Pain    Shoulder Pain   Pain location: left shoulder. This is a new problem. The current episode started 1 to 4 weeks ago. The problem occurs constantly. The problem has been gradually worsening. The quality of the pain is described as aching. The pain is moderate.   Review of Systems   Respiratory:  Negative for shortness of breath.    Cardiovascular:  Negative for chest pain.   Gastrointestinal:  Negative for abdominal pain.   Musculoskeletal:  Positive for arthralgias.     Objective:      Physical Exam  Vitals and nursing note reviewed.   Constitutional:       General: He is not in acute distress.     Appearance: Normal appearance. He is well-developed. He is not diaphoretic.   HENT:      Head: Normocephalic and atraumatic.   Pulmonary:      Effort: Pulmonary effort is normal. No respiratory distress.      Breath sounds: Normal breath sounds. No wheezing.   Musculoskeletal:      Comments: TTP to left shoulder with + empty can test.   Skin:     General: Skin is warm and dry.      Findings: No erythema or rash.   Neurological:      Mental Status: He is alert.       Assessment:       1. Acute pain of left shoulder    2. Drug-induced liver injury        Plan:     Problem List Items Addressed This Visit          GI    Drug-induced liver injury    Relevant Orders    Comprehensive Metabolic Panel     Other Visit Diagnoses       Acute pain of left shoulder    -  Primary    Relevant Orders    X-Ray Shoulder 2 or More Views Left             No

## 2023-07-05 ENCOUNTER — OFFICE VISIT (OUTPATIENT)
Dept: URGENT CARE | Facility: CLINIC | Age: 28
End: 2023-07-05
Payer: COMMERCIAL

## 2023-07-05 VITALS
OXYGEN SATURATION: 97 % | WEIGHT: 200 LBS | BODY MASS INDEX: 28.63 KG/M2 | HEIGHT: 70 IN | TEMPERATURE: 97 F | HEART RATE: 107 BPM | SYSTOLIC BLOOD PRESSURE: 133 MMHG | RESPIRATION RATE: 20 BRPM | DIASTOLIC BLOOD PRESSURE: 64 MMHG

## 2023-07-05 DIAGNOSIS — Z20.2 STD EXPOSURE: Primary | ICD-10-CM

## 2023-07-05 DIAGNOSIS — R30.0 DYSURIA: ICD-10-CM

## 2023-07-05 LAB
BILIRUB UR QL STRIP: NEGATIVE
GLUCOSE UR QL STRIP: NEGATIVE
KETONES UR QL STRIP: POSITIVE
LEUKOCYTE ESTERASE UR QL STRIP: NEGATIVE
PH, POC UA: 5.5
POC BLOOD, URINE: NEGATIVE
POC NITRATES, URINE: NEGATIVE
PROT UR QL STRIP: NEGATIVE
SP GR UR STRIP: 1.02 (ref 1–1.03)
UROBILINOGEN UR STRIP-ACNC: NORMAL (ref 0.3–2.2)

## 2023-07-05 PROCEDURE — 99213 PR OFFICE/OUTPT VISIT, EST, LEVL III, 20-29 MIN: ICD-10-PCS | Mod: 25,S$GLB,, | Performed by: NURSE PRACTITIONER

## 2023-07-05 PROCEDURE — 87591 N.GONORRHOEAE DNA AMP PROB: CPT | Performed by: NURSE PRACTITIONER

## 2023-07-05 PROCEDURE — 99213 OFFICE O/P EST LOW 20 MIN: CPT | Mod: 25,S$GLB,, | Performed by: NURSE PRACTITIONER

## 2023-07-05 PROCEDURE — 96372 PR INJECTION,THERAP/PROPH/DIAG2ST, IM OR SUBCUT: ICD-10-PCS | Mod: S$GLB,,, | Performed by: NURSE PRACTITIONER

## 2023-07-05 PROCEDURE — 81003 POCT URINALYSIS, DIPSTICK, AUTOMATED, W/O SCOPE: ICD-10-PCS | Mod: QW,S$GLB,, | Performed by: NURSE PRACTITIONER

## 2023-07-05 PROCEDURE — 96372 THER/PROPH/DIAG INJ SC/IM: CPT | Mod: S$GLB,,, | Performed by: NURSE PRACTITIONER

## 2023-07-05 PROCEDURE — 81003 URINALYSIS AUTO W/O SCOPE: CPT | Mod: QW,S$GLB,, | Performed by: NURSE PRACTITIONER

## 2023-07-05 RX ORDER — CEFTRIAXONE 500 MG/1
500 INJECTION, POWDER, FOR SOLUTION INTRAMUSCULAR; INTRAVENOUS
Status: COMPLETED | OUTPATIENT
Start: 2023-07-05 | End: 2023-07-05

## 2023-07-05 RX ORDER — DOXYCYCLINE 100 MG/1
100 CAPSULE ORAL 2 TIMES DAILY
Qty: 14 CAPSULE | Refills: 0 | Status: SHIPPED | OUTPATIENT
Start: 2023-07-05 | End: 2023-07-12

## 2023-07-05 RX ADMIN — CEFTRIAXONE 500 MG: 500 INJECTION, POWDER, FOR SOLUTION INTRAMUSCULAR; INTRAVENOUS at 03:07

## 2023-07-05 NOTE — PROGRESS NOTES
"Subjective:      Patient ID: Bennett Rubin is a 27 y.o. male.    Vitals:  height is 5' 10" (1.778 m) and weight is 90.7 kg (200 lb). His tympanic temperature is 97.1 °F (36.2 °C). His blood pressure is 133/64 and his pulse is 107. His respiration is 20 and oxygen saturation is 97%.     Chief Complaint: Exposure to STD (Burning when urinating, discharge, 1wk)    27 yr old male presents to the Urgent Care with complaint of burning sensation with urination and penile discharge. Patient denies any known lesions or rash. Patient reports gonorrhea and/or chlamydia exposure one week ago.     Exposure to STD  The patient's primary symptoms include penile discharge. The patient's pertinent negatives include no genital injury, genital itching, genital lesions, pelvic pain, penile pain, priapism, scrotal swelling or testicular pain. Primary symptoms comment: dysuria. This is a new problem. The current episode started 1 to 4 weeks ago. The problem occurs constantly. The problem has been gradually worsening. The patient is experiencing no pain. Associated symptoms include dysuria. Pertinent negatives include no abdominal pain, anorexia, chest pain, chills, constipation, coughing, diarrhea, discolored urine, fever, flank pain, frequency, headaches, hematuria, hesitancy, joint pain, joint swelling, nausea, painful intercourse, rash, shortness of breath, sore throat, urgency, urinary retention or vomiting. Nothing aggravates the symptoms. He has tried nothing for the symptoms. The treatment provided no relief. He is sexually active. He inconsistently uses condoms. Yes, his partner has an STD. There is no history of BPH, chlamydia, cryptorchidism, erectile aid use, erectile dysfunction, a femoral hernia, gonorrhea, herpes simplex, HIV, an inguinal hernia, kidney stones, prostatitis, sickle cell disease, syphilis or varicocele.     Constitution: Negative for chills and fever.   HENT:  Negative for sore throat.    Cardiovascular:  " Negative for chest pain.   Respiratory:  Negative for cough and shortness of breath.    Gastrointestinal:  Negative for abdominal pain, nausea, vomiting, constipation and diarrhea.   Genitourinary:  Positive for dysuria and penile discharge. Negative for frequency, urgency, flank pain, penile pain, scrotal swelling, testicular pain and pelvic pain.   Skin:  Negative for rash.   Neurological:  Negative for headaches.    Objective:     Physical Exam   Constitutional: He is oriented to person, place, and time. He appears well-developed. He is cooperative.  Non-toxic appearance. He does not appear ill.   HENT:   Head: Atraumatic.   Cardiovascular: Normal rate.   Pulmonary/Chest: Effort normal. No accessory muscle usage. No tachypnea. No respiratory distress.   Abdominal: Normal appearance.   Genitourinary:         Comments: Deferred exam     Neurological: He is alert and oriented to person, place, and time. Gait normal.   Skin: Skin is not diaphoretic.   Psychiatric: He experiences Normal attention. His speech is normal and behavior is normal. Mood normal. Cognition normal  Nursing note and vitals reviewed.    Assessment:     1. STD exposure    2. Dysuria        Plan:   Due to known exposure will treat empirically for gonorrhea and/or chlamydia infection.     DDX: UTI, syphilis, herpes, gonorrhea, chlamydia    STD exposure  -     cefTRIAXone injection 500 mg  -     doxycycline (VIBRAMYCIN) 100 MG Cap; Take 1 capsule (100 mg total) by mouth 2 (two) times daily. for 7 days  Dispense: 14 capsule; Refill: 0  -     C. trachomatis/N. gonorrhoeae by [facility]; Urine    Dysuria  -     POCT Urinalysis, Dipstick, Automated, W/O Scope      Patient Instructions   If you were prescribed a narcotic or controlled medication, do not drive or operate heavy equipment or machinery while taking these medications.  You must understand that you've received an Urgent Care treatment only and that you may be released before all your  medical problems are known or treated. You, the patient, will arrange for follow up care as instructed.  Follow up with your PCP or specialty clinic as directed within 2-5 days if not improved or as needed.  You can call [phone] to schedule an appointment with the appropriate provider.  If your condition worsens we recommend that you receive another evaluation at the emergency room immediately or contact your primary medical clinics after hours call service to discuss your concerns.  Please return here or go to the Emergency Department for any concerns or worsening of condition.

## 2023-07-05 NOTE — PATIENT INSTRUCTIONS

## 2023-07-06 LAB
C TRACH DNA SPEC QL NAA+PROBE: NOT DETECTED
N GONORRHOEA DNA SPEC QL NAA+PROBE: NOT DETECTED

## 2023-10-26 ENCOUNTER — PATIENT MESSAGE (OUTPATIENT)
Dept: HEPATOLOGY | Facility: CLINIC | Age: 28
End: 2023-10-26
Payer: COMMERCIAL

## 2023-10-26 ENCOUNTER — TELEPHONE (OUTPATIENT)
Dept: HEPATOLOGY | Facility: CLINIC | Age: 28
End: 2023-10-26
Payer: COMMERCIAL

## 2023-10-26 NOTE — TELEPHONE ENCOUNTER
LVM regarding upcoming appointment with Dr Quinones on Tuesday, 10/31/23 @ 11 located at the [facility] in [place].      Please contact the office @ [phone] to confirm this appt.

## 2023-10-29 NOTE — PROGRESS NOTES
Hepatology Clinic Note    Referring provider: No ref. provider found    Chief complaint:   Chief Complaint   Patient presents with    Abnormal ECG    Follow-up       Clinical Summary: Bennett Rubin is a 28 y.o. male who follow to clinic for Drug induced liver injury with elevated liver enzymes    10/31/2023  No new symptoms from liver standpoint     5/23  Apatite normal. Patient regained strength and weight  Liver enzymes improving slowly per last labs    2/23/23  Patient had ED visit for persistent symptoms  Most etiological labs work non diagnostic including MRI/MRCP    2/15/23   Patient was admitted [facility] 2/6/2023-2/7/2023 for evaluation of elevated liver enzymes Patient is here for second opinion. His labs today shows bilirubin 7 and elevated liver enzymes.     Patient mentions that he had visit  to [facility] a month ago during which he started having stomach upset and vomiting later he noticed jaundice.  He was admitted for evaluation at  [facility].  He mentions all the workup including viral hepatitis panel and ultrasound were negative and since his liver tests improved he was discharged after hydration. Per patient no diagnosis reached. No records available for review.  Today  Patient has significant nausea. Also complains itching and severo Coloured stools. Denies  vomiting, hematemesis, melena, or hematochezia.  He denies any signs of liver cell failure.  No encephalopathy or bleeding tendencies.  Patient has been taking anabolic steroids and testosterone last 5-6 months he has been off those  over last 3-4 weeks. No alternative or herbal medications. Taking Benadryl and colestipol for itching as prescribed during last hospitalization.     Abdominal pain - no  Reflux - no  Dysphagia - no   Bowel habits - normal  GI bleeding - none  NSAID usage - none    Denies alcohol      Past Medical History:   Diagnosis Date    Liver failure      Past Surgical History:   Procedure Laterality Date    CYST REMOVAL Left  2005    cyst removal under left nipple    RECONSTRUCTION OF FINGER Left     pinky finger     Family History   Problem Relation Age of Onset    Hypertension Father     Dementia Maternal Grandmother     Heart attack Paternal Grandmother     Heart attack Paternal Grandfather      Social History     Socioeconomic History    Marital status: Single   Tobacco Use    Smoking status: Former     Types: Vaping with nicotine     Passive exposure: Past    Smokeless tobacco: Never   Substance and Sexual Activity    Alcohol use: Not Currently    Drug use: Yes     Types: Anabolic steroids    Sexual activity: Yes     Partners: Female     Birth control/protection: Condom       Review of Systems:  14 point ROS negative except for above pertinent positives.    Review of Systems   Constitutional:  Negative for fever and weight loss.   HENT:  Negative for hearing loss and tinnitus.    Eyes:  Negative for double vision and photophobia.   Respiratory:  Negative for hemoptysis and wheezing.    Cardiovascular:  Negative for chest pain and palpitations.   Gastrointestinal:  Negative for blood in stool and melena.   Musculoskeletal:  Negative for falls.   Skin:  Negative for itching and rash.   Neurological:  Negative for focal weakness.   Endo/Heme/Allergies:  Negative for polydipsia.   Psychiatric/Behavioral:  Negative for hallucinations and suicidal ideas.            Objective:    Physical Exam:  Vitals:    10/31/23 1044   BP: 125/84   Pulse: 103             Physical Exam  Constitutional:       Appearance: Normal appearance.   HENT:      Head: Normocephalic and atraumatic.      Nose: No congestion.      Mouth/Throat:      Mouth: Mucous membranes are moist.   Eyes:      Pupils: Pupils are equal, round, and reactive to light.   Cardiovascular:      Heart sounds: No murmur heard.     No friction rub.   Pulmonary:      Effort: No respiratory distress.      Breath sounds: No stridor. No rhonchi.   Abdominal:      General: There is no distension.       Palpations: There is no mass.      Tenderness: There is no abdominal tenderness.   Musculoskeletal:         General: No swelling or deformity.      Cervical back: No rigidity or tenderness.   Skin:     Coloration: Skin is not jaundiced.      Findings: No bruising.   Neurological:      Mental Status: He is alert.      Cranial Nerves: No cranial nerve deficit.      Motor: No weakness.   Psychiatric:         Mood and Affect: Mood normal.         Behavior: Behavior normal.           Pertinent Labs:     Lab Visit on 10/31/2023   Component Date Value Ref Range Status    Prothrombin Time 10/31/2023 10.4  9.0 - 12.5 sec Final    INR 10/31/2023 1.0  0.8 - 1.2 Final        Imaging:  No images are attached to the encounter.    Assessment:  Problem List Items Addressed This Visit          GI    Drug-induced liver injury - Primary    Elevated liver enzymes    Relevant Orders    Comprehensive Metabolic Panel             Bennett was seen today for abnormal ecg and follow-up.    Diagnoses and all orders for this visit:    Drug-induced liver injury    Elevated liver enzymes  -     Comprehensive Metabolic Panel; Standing          Subacute Liver failure  -resolved  -Labs today  -No coaguloapthy      Drug induced liver injury  Labs today  -Avoid all Hepatotoxic medication        Follow up if symptoms worsen or fail to improve.      Time Statement  A total Time Includes preparing to see patient, reviewing  diagnostic studies and records, direct face-to-face visit, completing orders, medications , reconciliation, prescription management, and care coordination    We discussed in depth the nature of the patient's disease, the management plan in details. I have provided the patient with an opportunity to ask questions and have all questions answered to his satisfaction.       JACINTO FORD MD  Gastroenterology & Transplant Hepatology  [facility], [place] [facility]  [place]

## 2023-10-31 ENCOUNTER — HOSPITAL ENCOUNTER (OUTPATIENT)
Dept: RADIOLOGY | Facility: HOSPITAL | Age: 28
Discharge: HOME | End: 2023-10-31
Attending: INTERNAL MEDICINE
Payer: COMMERCIAL

## 2023-10-31 ENCOUNTER — OFFICE VISIT (OUTPATIENT)
Dept: HEPATOLOGY | Facility: CLINIC | Age: 28
End: 2023-10-31
Payer: COMMERCIAL

## 2023-10-31 VITALS
SYSTOLIC BLOOD PRESSURE: 125 MMHG | HEART RATE: 103 BPM | DIASTOLIC BLOOD PRESSURE: 84 MMHG | BODY MASS INDEX: 28.37 KG/M2 | WEIGHT: 198.19 LBS | HEIGHT: 70 IN

## 2023-10-31 DIAGNOSIS — R74.8 ELEVATED LIVER ENZYMES: ICD-10-CM

## 2023-10-31 DIAGNOSIS — K71.9 DRUG-INDUCED LIVER INJURY: Primary | ICD-10-CM

## 2023-10-31 PROCEDURE — 1159F MED LIST DOCD IN RCRD: CPT | Mod: CPTII,S$GLB,, | Performed by: INTERNAL MEDICINE

## 2023-10-31 PROCEDURE — 3074F PR MOST RECENT SYSTOLIC BLOOD PRESSURE < 130 MM HG: ICD-10-PCS | Mod: CPTII,S$GLB,, | Performed by: INTERNAL MEDICINE

## 2023-10-31 PROCEDURE — 1159F PR MEDICATION LIST DOCUMENTED IN MEDICAL RECORD: ICD-10-PCS | Mod: CPTII,S$GLB,, | Performed by: INTERNAL MEDICINE

## 2023-10-31 PROCEDURE — 99999 PR PBB SHADOW E&M-EST. PATIENT-LVL III: CPT | Mod: PBBFAC,,, | Performed by: INTERNAL MEDICINE

## 2023-10-31 PROCEDURE — 3008F PR BODY MASS INDEX (BMI) DOCUMENTED: ICD-10-PCS | Mod: CPTII,S$GLB,, | Performed by: INTERNAL MEDICINE

## 2023-10-31 PROCEDURE — 93975 VASCULAR STUDY: CPT | Mod: 26,,, | Performed by: RADIOLOGY

## 2023-10-31 PROCEDURE — 99213 OFFICE O/P EST LOW 20 MIN: CPT | Mod: S$GLB,,, | Performed by: INTERNAL MEDICINE

## 2023-10-31 PROCEDURE — 93975 VASCULAR STUDY: CPT | Mod: TC

## 2023-10-31 PROCEDURE — 3074F SYST BP LT 130 MM HG: CPT | Mod: CPTII,S$GLB,, | Performed by: INTERNAL MEDICINE

## 2023-10-31 PROCEDURE — 99213 PR OFFICE/OUTPT VISIT, EST, LEVL III, 20-29 MIN: ICD-10-PCS | Mod: S$GLB,,, | Performed by: INTERNAL MEDICINE

## 2023-10-31 PROCEDURE — 3079F DIAST BP 80-89 MM HG: CPT | Mod: CPTII,S$GLB,, | Performed by: INTERNAL MEDICINE

## 2023-10-31 PROCEDURE — 3008F BODY MASS INDEX DOCD: CPT | Mod: CPTII,S$GLB,, | Performed by: INTERNAL MEDICINE

## 2023-10-31 PROCEDURE — 93975 US DOPPLER ABDOMEN COMPLETE: ICD-10-PCS | Mod: 26,,, | Performed by: RADIOLOGY

## 2023-10-31 PROCEDURE — 3079F PR MOST RECENT DIASTOLIC BLOOD PRESSURE 80-89 MM HG: ICD-10-PCS | Mod: CPTII,S$GLB,, | Performed by: INTERNAL MEDICINE

## 2023-10-31 PROCEDURE — 99999 PR PBB SHADOW E&M-EST. PATIENT-LVL III: ICD-10-PCS | Mod: PBBFAC,,, | Performed by: INTERNAL MEDICINE

## 2023-10-31 RX ORDER — BACLOFEN 10 MG/1
TABLET ORAL
COMMUNITY
Start: 2023-06-06

## 2023-11-17 ENCOUNTER — CLINICAL SUPPORT (OUTPATIENT)
Dept: INTERNAL MEDICINE | Facility: CLINIC | Age: 28
End: 2023-11-17
Payer: COMMERCIAL

## 2023-11-17 DIAGNOSIS — Z48.02 VISIT FOR SUTURE REMOVAL: Primary | ICD-10-CM

## 2023-11-17 PROCEDURE — 99999 PR PBB SHADOW E&M-EST. PATIENT-LVL II: ICD-10-PCS | Mod: PBBFAC,,,

## 2023-11-17 PROCEDURE — 99999 PR PBB SHADOW E&M-EST. PATIENT-LVL II: CPT | Mod: PBBFAC,,,

## 2024-02-03 DIAGNOSIS — L70.9 ACNE, UNSPECIFIED ACNE TYPE: ICD-10-CM

## 2024-02-06 RX ORDER — CLINDAMYCIN PHOSPHATE 10 UG/ML
LOTION TOPICAL
Qty: 60 ML | Refills: 0 | OUTPATIENT
Start: 2024-02-06